# Patient Record
Sex: MALE | Race: WHITE | Employment: FULL TIME | URBAN - METROPOLITAN AREA
[De-identification: names, ages, dates, MRNs, and addresses within clinical notes are randomized per-mention and may not be internally consistent; named-entity substitution may affect disease eponyms.]

---

## 2018-09-02 ENCOUNTER — HOSPITAL ENCOUNTER (EMERGENCY)
Age: 28
Discharge: HOME | End: 2018-09-02
Attending: EMERGENCY MEDICINE

## 2018-09-02 VITALS
OXYGEN SATURATION: 99 % | RESPIRATION RATE: 18 BRPM | WEIGHT: 180 LBS | HEIGHT: 72 IN | SYSTOLIC BLOOD PRESSURE: 108 MMHG | HEART RATE: 74 BPM | TEMPERATURE: 98 F | DIASTOLIC BLOOD PRESSURE: 78 MMHG | BODY MASS INDEX: 24.38 KG/M2

## 2018-09-02 DIAGNOSIS — F14.10 COCAINE ABUSE (HCC): ICD-10-CM

## 2018-09-02 DIAGNOSIS — T40.1X1A ACCIDENTAL OVERDOSE OF HEROIN, INITIAL ENCOUNTER (HCC): Primary | ICD-10-CM

## 2018-09-02 LAB
ACETAMINOPHEN LEVEL: <5 MCG/ML (ref 10–30)
ALBUMIN SERPL-MCNC: 4.8 G/DL (ref 3.5–5.2)
ALP BLD-CCNC: 78 U/L (ref 40–129)
ALT SERPL-CCNC: 41 U/L (ref 0–40)
AMPHETAMINE SCREEN, URINE: NOT DETECTED
ANION GAP SERPL CALCULATED.3IONS-SCNC: 16 MMOL/L (ref 7–16)
AST SERPL-CCNC: 24 U/L (ref 0–39)
BACTERIA: ABNORMAL /HPF
BARBITURATE SCREEN URINE: NOT DETECTED
BENZODIAZEPINE SCREEN, URINE: NOT DETECTED
BILIRUB SERPL-MCNC: 0.6 MG/DL (ref 0–1.2)
BILIRUBIN URINE: NEGATIVE
BLOOD, URINE: NEGATIVE
BUN BLDV-MCNC: 14 MG/DL (ref 6–20)
CALCIUM SERPL-MCNC: 8.9 MG/DL (ref 8.6–10.2)
CANNABINOID SCREEN URINE: NOT DETECTED
CHLORIDE BLD-SCNC: 98 MMOL/L (ref 98–107)
CLARITY: CLEAR
CO2: 23 MMOL/L (ref 22–29)
COCAINE METABOLITE SCREEN URINE: POSITIVE
COLOR: YELLOW
CREAT SERPL-MCNC: 0.9 MG/DL (ref 0.7–1.2)
EKG ATRIAL RATE: 76 BPM
EKG P AXIS: 75 DEGREES
EKG P-R INTERVAL: 116 MS
EKG Q-T INTERVAL: 402 MS
EKG QRS DURATION: 98 MS
EKG QTC CALCULATION (BAZETT): 452 MS
EKG R AXIS: 84 DEGREES
EKG T AXIS: 63 DEGREES
EKG VENTRICULAR RATE: 76 BPM
ETHANOL: <10 MG/DL (ref 0–0.08)
GFR AFRICAN AMERICAN: >60
GFR NON-AFRICAN AMERICAN: >60 ML/MIN/1.73
GLUCOSE BLD-MCNC: 133 MG/DL (ref 74–109)
GLUCOSE URINE: NEGATIVE MG/DL
HCT VFR BLD CALC: 39.8 % (ref 37–54)
HEMOGLOBIN: 13.7 G/DL (ref 12.5–16.5)
KETONES, URINE: NEGATIVE MG/DL
LEUKOCYTE ESTERASE, URINE: NEGATIVE
MCH RBC QN AUTO: 31.4 PG (ref 26–35)
MCHC RBC AUTO-ENTMCNC: 34.4 % (ref 32–34.5)
MCV RBC AUTO: 91.1 FL (ref 80–99.9)
METHADONE SCREEN, URINE: NOT DETECTED
NITRITE, URINE: NEGATIVE
OPIATE SCREEN URINE: NOT DETECTED
PDW BLD-RTO: 12.8 FL (ref 11.5–15)
PH UA: 5.5 (ref 5–9)
PHENCYCLIDINE SCREEN URINE: NOT DETECTED
PLATELET # BLD: 236 E9/L (ref 130–450)
PMV BLD AUTO: 10.2 FL (ref 7–12)
POTASSIUM SERPL-SCNC: 4.5 MMOL/L (ref 3.5–5)
PROPOXYPHENE SCREEN: NOT DETECTED
PROTEIN UA: 100 MG/DL
RBC # BLD: 4.37 E12/L (ref 3.8–5.8)
RBC UA: ABNORMAL /HPF (ref 0–2)
SALICYLATE, SERUM: <0.3 MG/DL (ref 0–30)
SODIUM BLD-SCNC: 137 MMOL/L (ref 132–146)
SPECIFIC GRAVITY UA: >=1.03 (ref 1–1.03)
TOTAL PROTEIN: 8 G/DL (ref 6.4–8.3)
TRICYCLIC ANTIDEPRESSANTS SCREEN SERUM: NEGATIVE NG/ML
UROBILINOGEN, URINE: 0.2 E.U./DL
WBC # BLD: 13.1 E9/L (ref 4.5–11.5)
WBC UA: ABNORMAL /HPF (ref 0–5)

## 2018-09-02 PROCEDURE — 81001 URINALYSIS AUTO W/SCOPE: CPT

## 2018-09-02 PROCEDURE — 80053 COMPREHEN METABOLIC PANEL: CPT

## 2018-09-02 PROCEDURE — 80307 DRUG TEST PRSMV CHEM ANLYZR: CPT

## 2018-09-02 PROCEDURE — 36415 COLL VENOUS BLD VENIPUNCTURE: CPT

## 2018-09-02 PROCEDURE — 6370000000 HC RX 637 (ALT 250 FOR IP): Performed by: EMERGENCY MEDICINE

## 2018-09-02 PROCEDURE — 85027 COMPLETE CBC AUTOMATED: CPT

## 2018-09-02 PROCEDURE — 99284 EMERGENCY DEPT VISIT MOD MDM: CPT

## 2018-09-02 PROCEDURE — G0480 DRUG TEST DEF 1-7 CLASSES: HCPCS

## 2018-09-02 RX ORDER — ACETAMINOPHEN 325 MG/1
650 TABLET ORAL ONCE
Status: COMPLETED | OUTPATIENT
Start: 2018-09-02 | End: 2018-09-02

## 2018-09-02 RX ORDER — ACETAMINOPHEN 325 MG/1
TABLET ORAL
Status: DISCONTINUED
Start: 2018-09-02 | End: 2018-09-02 | Stop reason: HOSPADM

## 2018-09-02 RX ADMIN — ACETAMINOPHEN 650 MG: 325 TABLET ORAL at 03:15

## 2018-09-02 ASSESSMENT — PAIN DESCRIPTION - LOCATION: LOCATION: HEAD

## 2018-09-02 ASSESSMENT — PAIN DESCRIPTION - FREQUENCY: FREQUENCY: CONTINUOUS

## 2018-09-02 ASSESSMENT — PAIN SCALES - GENERAL
PAINLEVEL_OUTOF10: 0
PAINLEVEL_OUTOF10: 9
PAINLEVEL_OUTOF10: 10

## 2018-09-02 ASSESSMENT — PAIN DESCRIPTION - ORIENTATION: ORIENTATION: RIGHT;LEFT

## 2018-09-02 ASSESSMENT — PAIN DESCRIPTION - ONSET: ONSET: SUDDEN

## 2018-09-02 ASSESSMENT — PAIN DESCRIPTION - PROGRESSION: CLINICAL_PROGRESSION: GRADUALLY WORSENING

## 2018-09-02 ASSESSMENT — PAIN DESCRIPTION - PAIN TYPE: TYPE: ACUTE PAIN

## 2018-09-02 ASSESSMENT — PAIN DESCRIPTION - DESCRIPTORS: DESCRIPTORS: HEADACHE

## 2018-09-02 NOTE — ED PROVIDER NOTES
crepitus, no meningeal signs  Respiratory: Lungs clear to auscultation bilaterally, no wheezes, rales, or rhonchi. Not in respiratory distress  Cardiovascular:  Regular rate. Regular rhythm. No murmurs, gallops, or rubs. 2+ distal pulses  GI:  Abdomen Soft, Non tender, Non distended. +BS. No rebound, guarding, or rigidity. No pulsatile masses. Musculoskeletal: Moves all extremities x 4. Warm and well perfused, no clubbing, cyanosis, or edema. Capillary refill <3 seconds  Integument: skin warm and dry. No rashes. Neurologic: GCS 15, no focal deficits, symmetric strength in the upper and lower extremities bilaterally  Psychiatric: Normal Affect      -------------------------------------------------- RESULTS -------------------------------------------------  I have personally reviewed all laboratory and imaging results for this patient. Results are listed below.      LABS:  Results for orders placed or performed during the hospital encounter of 09/02/18   CBC   Result Value Ref Range    WBC 13.1 (H) 4.5 - 11.5 E9/L    RBC 4.37 3.80 - 5.80 E12/L    Hemoglobin 13.7 12.5 - 16.5 g/dL    Hematocrit 39.8 37.0 - 54.0 %    MCV 91.1 80.0 - 99.9 fL    MCH 31.4 26.0 - 35.0 pg    MCHC 34.4 32.0 - 34.5 %    RDW 12.8 11.5 - 15.0 fL    Platelets 032 644 - 591 E9/L    MPV 10.2 7.0 - 12.0 fL   Comprehensive Metabolic Panel   Result Value Ref Range    Sodium 137 132 - 146 mmol/L    Potassium 4.5 3.5 - 5.0 mmol/L    Chloride 98 98 - 107 mmol/L    CO2 23 22 - 29 mmol/L    Anion Gap 16 7 - 16 mmol/L    Glucose 133 (H) 74 - 109 mg/dL    BUN 14 6 - 20 mg/dL    CREATININE 0.9 0.7 - 1.2 mg/dL    GFR Non-African American >60 >=60 mL/min/1.73    GFR African American >60     Calcium 8.9 8.6 - 10.2 mg/dL    Total Protein 8.0 6.4 - 8.3 g/dL    Alb 4.8 3.5 - 5.2 g/dL    Total Bilirubin 0.6 0.0 - 1.2 mg/dL    Alkaline Phosphatase 78 40 - 129 U/L    ALT 41 (H) 0 - 40 U/L    AST 24 0 - 39 U/L   Urinalysis   Result Value Ref Range    Color, UA

## 2018-09-06 LAB — COCAINE, CONFIRM, URINE: >1000 NG/ML

## 2019-02-20 ENCOUNTER — HOSPITAL ENCOUNTER (OUTPATIENT)
Age: 29
Discharge: HOME | End: 2019-02-20
Payer: MEDICAID

## 2019-02-20 PROCEDURE — 87340 HEPATITIS B SURFACE AG IA: CPT

## 2019-02-20 PROCEDURE — 86803 HEPATITIS C AB TEST: CPT

## 2019-02-20 PROCEDURE — 36415 COLL VENOUS BLD VENIPUNCTURE: CPT

## 2019-02-20 PROCEDURE — 86703 HIV-1/HIV-2 1 RESULT ANTBDY: CPT

## 2019-02-20 PROCEDURE — 87350 HEPATITIS BE AG IA: CPT

## 2019-02-20 PROCEDURE — 86706 HEP B SURFACE ANTIBODY: CPT

## 2019-02-21 LAB
HBV SURFACE AB TITR SER: REACTIVE {TITER}
HEPATITIS B SURFACE ANTIGEN INTERPRETATION: NORMAL
HEPATITIS C ANTIBODY INTERPRETATION: NORMAL
HIV-1 AND HIV-2 ANTIBODIES: NORMAL

## 2019-02-22 LAB — HEPATITIS BE ANTIGEN: NEGATIVE

## 2019-04-05 ENCOUNTER — OFFICE VISIT (OUTPATIENT)
Dept: FAMILY MEDICINE CLINIC | Age: 29
End: 2019-04-05
Payer: MEDICAID

## 2019-04-05 VITALS
SYSTOLIC BLOOD PRESSURE: 110 MMHG | HEIGHT: 72 IN | HEART RATE: 111 BPM | DIASTOLIC BLOOD PRESSURE: 78 MMHG | TEMPERATURE: 99.5 F | WEIGHT: 204.5 LBS | OXYGEN SATURATION: 96 % | BODY MASS INDEX: 27.7 KG/M2 | RESPIRATION RATE: 16 BRPM

## 2019-04-05 DIAGNOSIS — J11.1 INFLUENZA-LIKE ILLNESS: Primary | ICD-10-CM

## 2019-04-05 LAB
INFLUENZA A ANTIGEN, POC: NORMAL
INFLUENZA B ANTIGEN, POC: NORMAL

## 2019-04-05 PROCEDURE — G8419 CALC BMI OUT NRM PARAM NOF/U: HCPCS | Performed by: PHYSICIAN ASSISTANT

## 2019-04-05 PROCEDURE — 99213 OFFICE O/P EST LOW 20 MIN: CPT | Performed by: PHYSICIAN ASSISTANT

## 2019-04-05 PROCEDURE — 87804 INFLUENZA ASSAY W/OPTIC: CPT | Performed by: PHYSICIAN ASSISTANT

## 2019-04-05 PROCEDURE — G8427 DOCREV CUR MEDS BY ELIG CLIN: HCPCS | Performed by: PHYSICIAN ASSISTANT

## 2019-04-05 PROCEDURE — 4004F PT TOBACCO SCREEN RCVD TLK: CPT | Performed by: PHYSICIAN ASSISTANT

## 2019-04-05 RX ORDER — OSELTAMIVIR PHOSPHATE 75 MG/1
75 CAPSULE ORAL 2 TIMES DAILY
Qty: 10 CAPSULE | Refills: 0 | Status: SHIPPED | OUTPATIENT
Start: 2019-04-05 | End: 2019-04-10

## 2019-04-05 RX ORDER — BROMPHENIRAMINE MALEATE, PSEUDOEPHEDRINE HYDROCHLORIDE, AND DEXTROMETHORPHAN HYDROBROMIDE 2; 30; 10 MG/5ML; MG/5ML; MG/5ML
10 SYRUP ORAL 4 TIMES DAILY PRN
Qty: 120 ML | Refills: 0 | Status: SHIPPED
Start: 2019-04-05 | End: 2021-04-06 | Stop reason: ALTCHOICE

## 2019-04-05 RX ORDER — IBUPROFEN 800 MG/1
800 TABLET ORAL EVERY 8 HOURS PRN
Qty: 20 TABLET | Refills: 0 | Status: SHIPPED
Start: 2019-04-05 | End: 2021-04-06 | Stop reason: ALTCHOICE

## 2019-04-05 NOTE — PROGRESS NOTES
Chief Complaint   Generalized Body Aches (x 48 hours); Congestion (chills, subj fever); Cough (productive); and Nasal Congestion    History of Present Illness   Source of history provided by: patient. Michael Mims is a 33 y.o. old male who has a past medical history of: History reviewed. No pertinent past medical history. Presents to the [facility] with complaints of a nonproductive cough, subjective fever, body aches, chills, mild nausea, sore throat, and nasal congestion/drainage. States symptoms have been present x 48 hours. Denies any CP, SOB, abdominal pain, vomiting, diarrhea, rash, or lethargy. Has been taking Dayquil without symptomatic relief. Denies any hx of asthma. Of note, pt came with a friend today who tested positive for flu in our office. ROS   Pertinent positives and negatives are stated within HPI, all other systems reviewed and are negative. History reviewed. No pertinent surgical history. Social History:  reports that he has never smoked. His smokeless tobacco use includes chew. He reports that he does not drink alcohol or use drugs. Family History: family history is not on file. Allergies: Patient has no known allergies. Physical Exam      VS:  /78 (Site: Left Upper Arm, Position: Sitting, Cuff Size: Medium Adult)   Pulse 111   Temp 99.5 °F (37.5 °C) (Oral)   Resp 16   Ht 6' (1.829 m)   Wt 204 lb 8 oz (92.8 kg)   SpO2 96%   BMI 27.74 kg/m²    Oxygen Saturation Interpretation: Normal.    Constitutional:  Alert, development consistent with age. NAD. Head:  NC/NT. Airway patent. Ears: TMs dull bilaterally. Canals without exudate or swelling bilaterally. Nose: Mild congestion of the nasal mucosa with clear drainage. Mouth: Posterior pharynx with mild erythema and clear postnasal drip. No tonsillar hypertrophy or exudate. Neck:  Normal ROM. Supple. No anterior cervical adenopathy noted. Lungs: CTAB without wheezes, rales, or rhonchi.   CV:  Regular rate

## 2021-04-06 ENCOUNTER — HOSPITAL ENCOUNTER (EMERGENCY)
Age: 31
Discharge: HOME | End: 2021-04-06
Payer: COMMERCIAL

## 2021-04-06 ENCOUNTER — APPOINTMENT (OUTPATIENT)
Dept: GENERAL RADIOLOGY | Age: 31
End: 2021-04-06
Payer: COMMERCIAL

## 2021-04-06 VITALS
SYSTOLIC BLOOD PRESSURE: 120 MMHG | WEIGHT: 198 LBS | BODY MASS INDEX: 26.82 KG/M2 | TEMPERATURE: 99.3 F | DIASTOLIC BLOOD PRESSURE: 80 MMHG | RESPIRATION RATE: 16 BRPM | OXYGEN SATURATION: 98 % | HEART RATE: 94 BPM | HEIGHT: 72 IN

## 2021-04-06 DIAGNOSIS — Z20.2 POSSIBLE EXPOSURE TO STD: ICD-10-CM

## 2021-04-06 DIAGNOSIS — J02.9 ACUTE PHARYNGITIS, UNSPECIFIED ETIOLOGY: ICD-10-CM

## 2021-04-06 DIAGNOSIS — R11.2 NON-INTRACTABLE VOMITING WITH NAUSEA, UNSPECIFIED VOMITING TYPE: ICD-10-CM

## 2021-04-06 DIAGNOSIS — Z20.822 SUSPECTED COVID-19 VIRUS INFECTION: Primary | ICD-10-CM

## 2021-04-06 LAB
ALBUMIN SERPL-MCNC: 4.5 G/DL (ref 3.5–5.2)
ALP BLD-CCNC: 55 U/L (ref 40–129)
ALT SERPL-CCNC: 31 U/L (ref 0–40)
ANION GAP SERPL CALCULATED.3IONS-SCNC: 11 MMOL/L (ref 7–16)
AST SERPL-CCNC: 15 U/L (ref 0–39)
BASOPHILS ABSOLUTE: 0.02 E9/L (ref 0–0.2)
BASOPHILS RELATIVE PERCENT: 0.2 % (ref 0–2)
BILIRUB SERPL-MCNC: 0.8 MG/DL (ref 0–1.2)
BILIRUBIN URINE: NEGATIVE
BLOOD, URINE: NEGATIVE
BUN BLDV-MCNC: 10 MG/DL (ref 6–20)
CALCIUM SERPL-MCNC: 9.3 MG/DL (ref 8.6–10.2)
CHLORIDE BLD-SCNC: 101 MMOL/L (ref 98–107)
CLARITY: CLEAR
CO2: 26 MMOL/L (ref 22–29)
COLOR: YELLOW
CREAT SERPL-MCNC: 0.9 MG/DL (ref 0.7–1.2)
EOSINOPHILS ABSOLUTE: 0.03 E9/L (ref 0.05–0.5)
EOSINOPHILS RELATIVE PERCENT: 0.2 % (ref 0–6)
GFR AFRICAN AMERICAN: >60
GFR NON-AFRICAN AMERICAN: >60 ML/MIN/1.73
GLUCOSE BLD-MCNC: 105 MG/DL (ref 74–99)
GLUCOSE URINE: NEGATIVE MG/DL
HCT VFR BLD CALC: 44.3 % (ref 37–54)
HEMOGLOBIN: 15.5 G/DL (ref 12.5–16.5)
IMMATURE GRANULOCYTES #: 0.04 E9/L
IMMATURE GRANULOCYTES %: 0.3 % (ref 0–5)
INFLUENZA A BY PCR: NOT DETECTED
INFLUENZA B BY PCR: NOT DETECTED
KETONES, URINE: NEGATIVE MG/DL
LACTIC ACID, SEPSIS: 1.3 MMOL/L (ref 0.5–1.9)
LEUKOCYTE ESTERASE, URINE: NEGATIVE
LYMPHOCYTES ABSOLUTE: 1.01 E9/L (ref 1.5–4)
LYMPHOCYTES RELATIVE PERCENT: 8 % (ref 20–42)
MCH RBC QN AUTO: 31.7 PG (ref 26–35)
MCHC RBC AUTO-ENTMCNC: 35 % (ref 32–34.5)
MCV RBC AUTO: 90.6 FL (ref 80–99.9)
MONO TEST: NEGATIVE
MONOCYTES ABSOLUTE: 1.32 E9/L (ref 0.1–0.95)
MONOCYTES RELATIVE PERCENT: 10.4 % (ref 2–12)
NEUTROPHILS ABSOLUTE: 10.26 E9/L (ref 1.8–7.3)
NEUTROPHILS RELATIVE PERCENT: 80.9 % (ref 43–80)
NITRITE, URINE: NEGATIVE
PDW BLD-RTO: 12.3 FL (ref 11.5–15)
PH UA: 7.5 (ref 5–9)
PLATELET # BLD: 177 E9/L (ref 130–450)
PMV BLD AUTO: 10.6 FL (ref 7–12)
POTASSIUM SERPL-SCNC: 4.2 MMOL/L (ref 3.5–5)
PROTEIN UA: NEGATIVE MG/DL
RBC # BLD: 4.89 E12/L (ref 3.8–5.8)
SODIUM BLD-SCNC: 138 MMOL/L (ref 132–146)
SPECIFIC GRAVITY UA: 1.02 (ref 1–1.03)
STREP GRP A PCR: NEGATIVE
TOTAL PROTEIN: 7.6 G/DL (ref 6.4–8.3)
UROBILINOGEN, URINE: 0.2 E.U./DL
WBC # BLD: 12.7 E9/L (ref 4.5–11.5)

## 2021-04-06 PROCEDURE — 99284 EMERGENCY DEPT VISIT MOD MDM: CPT

## 2021-04-06 PROCEDURE — 96374 THER/PROPH/DIAG INJ IV PUSH: CPT

## 2021-04-06 PROCEDURE — U0005 INFEC AGEN DETEC AMPLI PROBE: HCPCS

## 2021-04-06 PROCEDURE — 96375 TX/PRO/DX INJ NEW DRUG ADDON: CPT

## 2021-04-06 PROCEDURE — U0003 INFECTIOUS AGENT DETECTION BY NUCLEIC ACID (DNA OR RNA); SEVERE ACUTE RESPIRATORY SYNDROME CORONAVIRUS 2 (SARS-COV-2) (CORONAVIRUS DISEASE [COVID-19]), AMPLIFIED PROBE TECHNIQUE, MAKING USE OF HIGH THROUGHPUT TECHNOLOGIES AS DESCRIBED BY CMS-2020-01-R: HCPCS

## 2021-04-06 PROCEDURE — 36415 COLL VENOUS BLD VENIPUNCTURE: CPT

## 2021-04-06 PROCEDURE — 87070 CULTURE OTHR SPECIMN AEROBIC: CPT

## 2021-04-06 PROCEDURE — 87880 STREP A ASSAY W/OPTIC: CPT

## 2021-04-06 PROCEDURE — 83605 ASSAY OF LACTIC ACID: CPT

## 2021-04-06 PROCEDURE — 2580000003 HC RX 258: Performed by: NURSE PRACTITIONER

## 2021-04-06 PROCEDURE — 6370000000 HC RX 637 (ALT 250 FOR IP): Performed by: NURSE PRACTITIONER

## 2021-04-06 PROCEDURE — 87591 N.GONORRHOEAE DNA AMP PROB: CPT

## 2021-04-06 PROCEDURE — 81003 URINALYSIS AUTO W/O SCOPE: CPT

## 2021-04-06 PROCEDURE — 87491 CHLMYD TRACH DNA AMP PROBE: CPT

## 2021-04-06 PROCEDURE — 6360000002 HC RX W HCPCS: Performed by: NURSE PRACTITIONER

## 2021-04-06 PROCEDURE — 86308 HETEROPHILE ANTIBODY SCREEN: CPT

## 2021-04-06 PROCEDURE — 2500000003 HC RX 250 WO HCPCS: Performed by: NURSE PRACTITIONER

## 2021-04-06 PROCEDURE — 85025 COMPLETE CBC W/AUTO DIFF WBC: CPT

## 2021-04-06 PROCEDURE — 87502 INFLUENZA DNA AMP PROBE: CPT

## 2021-04-06 PROCEDURE — 80053 COMPREHEN METABOLIC PANEL: CPT

## 2021-04-06 PROCEDURE — 96372 THER/PROPH/DIAG INJ SC/IM: CPT

## 2021-04-06 PROCEDURE — 71045 X-RAY EXAM CHEST 1 VIEW: CPT

## 2021-04-06 RX ORDER — ONDANSETRON 2 MG/ML
4 INJECTION INTRAMUSCULAR; INTRAVENOUS ONCE
Status: COMPLETED | OUTPATIENT
Start: 2021-04-06 | End: 2021-04-06

## 2021-04-06 RX ORDER — 0.9 % SODIUM CHLORIDE 0.9 %
1000 INTRAVENOUS SOLUTION INTRAVENOUS ONCE
Status: COMPLETED | OUTPATIENT
Start: 2021-04-06 | End: 2021-04-06

## 2021-04-06 RX ORDER — KETOROLAC TROMETHAMINE 30 MG/ML
15 INJECTION, SOLUTION INTRAMUSCULAR; INTRAVENOUS ONCE
Status: COMPLETED | OUTPATIENT
Start: 2021-04-06 | End: 2021-04-06

## 2021-04-06 RX ORDER — AMOXICILLIN 500 MG/1
500 CAPSULE ORAL 2 TIMES DAILY
Qty: 20 CAPSULE | Refills: 0 | Status: SHIPPED | OUTPATIENT
Start: 2021-04-06 | End: 2021-04-16

## 2021-04-06 RX ORDER — AZITHROMYCIN 250 MG/1
1000 TABLET, FILM COATED ORAL ONCE
Status: COMPLETED | OUTPATIENT
Start: 2021-04-06 | End: 2021-04-06

## 2021-04-06 RX ORDER — ONDANSETRON 4 MG/1
4 TABLET, ORALLY DISINTEGRATING ORAL EVERY 8 HOURS PRN
Qty: 6 TABLET | Refills: 0 | Status: SHIPPED | OUTPATIENT
Start: 2021-04-06 | End: 2022-01-26

## 2021-04-06 RX ADMIN — KETOROLAC TROMETHAMINE 15 MG: 30 INJECTION, SOLUTION INTRAMUSCULAR; INTRAVENOUS at 17:52

## 2021-04-06 RX ADMIN — SODIUM CHLORIDE 1000 ML: 9 INJECTION, SOLUTION INTRAVENOUS at 17:52

## 2021-04-06 RX ADMIN — AZITHROMYCIN 1000 MG: 250 TABLET, FILM COATED ORAL at 19:06

## 2021-04-06 RX ADMIN — LIDOCAINE HYDROCHLORIDE 500 MG: 10 INJECTION, SOLUTION EPIDURAL; INFILTRATION; INTRACAUDAL; PERINEURAL at 19:05

## 2021-04-06 RX ADMIN — ONDANSETRON 4 MG: 2 INJECTION INTRAMUSCULAR; INTRAVENOUS at 17:52

## 2021-04-06 ASSESSMENT — PAIN SCALES - GENERAL
PAINLEVEL_OUTOF10: 5
PAINLEVEL_OUTOF10: 5

## 2021-04-06 ASSESSMENT — PAIN DESCRIPTION - FREQUENCY: FREQUENCY: CONTINUOUS

## 2021-04-06 ASSESSMENT — PAIN DESCRIPTION - LOCATION: LOCATION: OTHER (COMMENT)

## 2021-04-06 ASSESSMENT — PAIN DESCRIPTION - PAIN TYPE: TYPE: ACUTE PAIN

## 2021-04-06 ASSESSMENT — PAIN DESCRIPTION - ONSET: ONSET: GRADUAL

## 2021-04-06 NOTE — LETTER
[address] [facility] Emergency Department  [id number] [facility] [address]  Phone: [phone]             April 6, 2021    Patient: Breana Brar   YOB: 1990   Date of Visit: 4/6/2021       To Whom It May Concern:    Bryan Arias was seen and treated in our emergency department on 4/6/2021. Bryan Arias was tested for Covid 19. He is instructed to self isolate until test results.       Sincerely,             Signature:__________________________________

## 2021-04-06 NOTE — ED PROVIDER NOTES
[facility]  Department of Emergency Medicine   ED  Encounter Note  Admit Date/RoomTime: 2021  4:56 PM  ED Room: JUNIOR/JUNIOR      NAME: Claudia Douglas  : 1990  MRN: [id number]     Chief Complaint:  Nausea (started around 0100), Pharyngitis (noticed white spots in the back of his throat, was concerned for possible STI), Emesis (vomited liquid, no food.), Generalized Body Aches, and Fever    History of Present Illness      Claudia Douglas is a 31 y.o. old male who presents to the emergency department for complaint of sore throat, nausea, vomiting, body aches, and a fever starting around 1 AM this morning. He reported that he works midnights and was at work when he developed symptoms. He reports that he noticed white spots on his tonsils. Also reports that he has been involved with a female and is concerned for possible exposure to STD. Denies penile discharge or urinary complaints. Reports that he did have oral sex with this female. Reports 2 episode of emesis since 1 AM.  No hematemesis or coffee-ground emesis. States that he has had a low-grade temp of 100.4. Denies diarrhea, melena, hematochezia, constipation, abdominal pain, chest pain, shortness of breath, back pain, neck pain/stiffness, loss of smell or taste, cough, earache, runny nose, difficulty swallowing, lightheadedness, dizziness, syncope, or any other plaints at this time. Exposed To: Streptococcus: no.                              Infectious Mononucleosis:  unknown. Symptoms:  Pain:  Yes.                             Muffled Voice:  No.                            Hoarse:  No.                            Difficulty with Secretions:  No.    Centor Score (MODIFIED) For Strep Pharyngitis:    Age 3-14 Years   no (0)     Age >44 Years   NO     Exudate or Swelling on Tonsils   yes (1)     Tender/Swollen Anterior Cervical Nodes   no (0)     Fever? (T > 38°C, 100.4°F)   yes (1) Absence of Cough   yes (1)   TOTAL POINTS   3   Score of Zero = Probability of Strep Pharyngitis: 1% - 2.5%. ,   No further testing nor antibiotics. Score of 1 = Probability of Strep Pharyngitis: 5% - 10%. ,   No further testing nor antibiotics. Score of 2 = Probability of Strep Phar: 11% - 17%. Culture/test all, ATB's only for positive culture results. Score of 3 = Probability of Strep Phar: 28% - 35%. Culture/test all, ATB's only for positive culture results  Score of 4 or 5 = Probability of Strep Pharyngitis: 51% - 53%. Treat empirically with antibiotics. ROS   Pertinent positives and negatives are stated within HPI, all other systems reviewed and are negative. Past Medical History:  has no past medical history on file. Surgical History:  has no past surgical history on file. Social History:  reports that he has never smoked. His smokeless tobacco use includes chew. He reports that he does not drink alcohol or use drugs. Family History: family history is not on file. Allergies: Patient has no known allergies. Physical Exam   Oxygen Saturation Interpretation: Normal.        ED Triage Vitals   BP Temp Temp Source Pulse Resp SpO2 Height Weight   04/06/21 1654 04/06/21 1653 04/06/21 1653 04/06/21 1653 04/06/21 1653 04/06/21 1653 04/06/21 1711 04/06/21 1711   122/84 100.3 °F (37.9 °C) Temporal 118 18 98 % 6' (1.829 m) 198 lb (89.8 kg)         Constitutional:  Alert, development consistent with age. .  Ears:  TMs without perforation, injection, or bulging. External canals clear without exudate. Throat: Airway Patent. mild erythema, tonsillar hypertrophy, 1+ and exudates present. Neck/Lymphatic:  Neck supple. There is Bilateral  anterior cervical node tenderness. respiratory:  Clear to auscultation and breath sounds equal.    CV: Regular rate and rhythm, normal heart sounds, without pathological murmurs, ectopy, gallops, or rubs. Peripheral pulses 2 + palpable.    GI:  Abdomen Soft, nontender, good bowel sounds. No firm or pulsatile mass. Inegument:  No rashes, erythema present. Neurological:  Oriented. Motor functions intact.     Lab / Imaging Results   (All laboratory and radiology results have been personally reviewed by myself)  Labs:  Results for orders placed or performed during the hospital encounter of 04/06/21   C.trachomatis N.gonorrhoeae DNA, Urine    Specimen: Urine   Result Value Ref Range    Source Urine    Strep Screen Group A Throat    Specimen: Throat   Result Value Ref Range    Strep Grp A PCR Negative Negative   Rapid influenza A/B antigens    Specimen: Nasopharyngeal   Result Value Ref Range    Influenza A by PCR Not Detected Not Detected    Influenza B by PCR Not Detected Not Detected   CBC Auto Differential   Result Value Ref Range    WBC 12.7 (H) 4.5 - 11.5 E9/L    RBC 4.89 3.80 - 5.80 E12/L    Hemoglobin 15.5 12.5 - 16.5 g/dL    Hematocrit 44.3 37.0 - 54.0 %    MCV 90.6 80.0 - 99.9 fL    MCH 31.7 26.0 - 35.0 pg    MCHC 35.0 (H) 32.0 - 34.5 %    RDW 12.3 11.5 - 15.0 fL    Platelets 984 339 - 593 E9/L    MPV 10.6 7.0 - 12.0 fL    Neutrophils % 80.9 (H) 43.0 - 80.0 %    Immature Granulocytes % 0.3 0.0 - 5.0 %    Lymphocytes % 8.0 (L) 20.0 - 42.0 %    Monocytes % 10.4 2.0 - 12.0 %    Eosinophils % 0.2 0.0 - 6.0 %    Basophils % 0.2 0.0 - 2.0 %    Neutrophils Absolute 10.26 (H) 1.80 - 7.30 E9/L    Immature Granulocytes # 0.04 E9/L    Lymphocytes Absolute 1.01 (L) 1.50 - 4.00 E9/L    Monocytes Absolute 1.32 (H) 0.10 - 0.95 E9/L    Eosinophils Absolute 0.03 (L) 0.05 - 0.50 E9/L    Basophils Absolute 0.02 0.00 - 0.20 E9/L   Lactate, Sepsis   Result Value Ref Range    Lactic Acid, Sepsis 1.3 0.5 - 1.9 mmol/L   Urinalysis, reflex to microscopic   Result Value Ref Range    Color, UA Yellow Straw/Yellow    Clarity, UA Clear Clear    Glucose, Ur Negative Negative mg/dL    Bilirubin Urine Negative Negative    Ketones, Urine Negative Negative mg/dL    Specific Gravity, UA 1.020 1.005 - 1.030    Blood, Urine Negative Negative    pH, UA 7.5 5.0 - 9.0    Protein, UA Negative Negative mg/dL    Urobilinogen, Urine 0.2 <2.0 E.U./dL    Nitrite, Urine Negative Negative    Leukocyte Esterase, Urine Negative Negative   Mononucleosis screen   Result Value Ref Range    Mono Test Negative Negative   Comprehensive Metabolic Panel   Result Value Ref Range    Sodium 138 132 - 146 mmol/L    Potassium 4.2 3.5 - 5.0 mmol/L    Chloride 101 98 - 107 mmol/L    CO2 26 22 - 29 mmol/L    Anion Gap 11 7 - 16 mmol/L    Glucose 105 (H) 74 - 99 mg/dL    BUN 10 6 - 20 mg/dL    CREATININE 0.9 0.7 - 1.2 mg/dL    GFR Non-African American >60 >=60 mL/min/1.73    GFR African American >60     Calcium 9.3 8.6 - 10.2 mg/dL    Total Protein 7.6 6.4 - 8.3 g/dL    Albumin 4.5 3.5 - 5.2 g/dL    Total Bilirubin 0.8 0.0 - 1.2 mg/dL    Alkaline Phosphatase 55 40 - 129 U/L    ALT 31 0 - 40 U/L    AST 15 0 - 39 U/L     Imaging: All Radiology results interpreted by Radiologist unless otherwise noted. XR CHEST PORTABLE   Final Result   No acute process. ED Course / Medical Decision Making     Medications   0.9 % sodium chloride bolus (0 mLs Intravenous Stopped 4/6/21 2055)   ketorolac (TORADOL) injection 15 mg (15 mg Intravenous Given 4/6/21 1752)   ondansetron (ZOFRAN) injection 4 mg (4 mg Intravenous Given 4/6/21 1752)   cefTRIAXone (ROCEPHIN) 500 mg in lidocaine 1 % 1.43 mL IM Injection (500 mg Intramuscular Given 4/6/21 1905)   azithromycin (ZITHROMAX) tablet 1,000 mg (1,000 mg Oral Given 4/6/21 1906)        Consult(s):   None    Procedure(s):   none    MDM:   Mallorie Vallejo is a 70-year-old male who presented to the emergency department complaint of sore throat, nausea, vomiting, and body aches. He also reported concern for possible STI exposure. No hematic emesis or coffee-ground emesis. No urinary complaints. CBC shows mild leukocytosis of 12.7, H&H 15.5/44. 3. Rapid strep negative. Influenza AMB negative. Mononucleosis negative. Lactic acid 1.3. CMP unremarkable. Chest x-ray negative. Urine negative for any signs or concerns for UTI. Urine GC chlamydia pending. Patient was empirically treated with Rocephin and Zithromax. On physical exam he was noted to have oropharynx erythema with exudate noted. There was mild anterior cervical tenderness. Uvula was midline. No signs or concerns for abscess formation. prescribed amoxicillin. Covid test completed and pending results. He was instructed to self isolate until test results. He verbalized understanding agrees with plan. Tolerated p.o. while in the ED. He remained hemodynamically stable, nontoxic, and appropriate for outpatient management. Instructed to have close follow-up with his PCP and advised to return for any new, change, worsening symptoms or concerns. At this time the patient is without objective evidence of an acute process requiring hospitalization or inpatient management. They have remained hemodynamically stable throughout their entire ED visit and are stable for discharge with outpatient follow-up. The plan has been discussed in detail and they are aware of the specific conditions for emergent return, as well as the importance of follow-up. Counseling:  I reviewed today's visit with the patient in addition to providing specific details for the plan of care and counseling regarding the diagnosis and prognosis. Questions are answered at this time and are agreeable with the plan. Assessment     1. Suspected COVID-19 virus infection    2. Acute pharyngitis, unspecified etiology    3. Possible exposure to STD    4. Non-intractable vomiting with nausea, unspecified vomiting type      Plan   Discharge home.   Patient condition is good    New Medications     Discharge Medication List as of 4/6/2021  7:43 PM      START taking these medications    Details   amoxicillin (AMOXIL) 500 MG capsule Take 1 capsule by mouth 2 times daily for 10 days, Disp-20 capsule, R-0Print      ondansetron (ZOFRAN ODT) 4 MG disintegrating tablet Take 1 tablet by mouth every 8 hours as needed for Nausea or Vomiting, Disp-6 tablet, R-0Print           Electronically signed by NILAM Ribera CNP   DD: 4/6/21  **This report was transcribed using voice recognition software. Every effort was made to ensure accuracy; however, inadvertent computerized transcription errors may be present.   END OF ED PROVIDER NOTE      NILAM Ribera CNP  04/06/21 [id number]

## 2021-04-07 ENCOUNTER — CARE COORDINATION (OUTPATIENT)
Dept: CARE COORDINATION | Age: 31
End: 2021-04-07

## 2021-04-07 NOTE — CARE COORDINATION
Patient returned ACM's call. Patient contacted regarding [facility]. Discussed COVID-19 related testing which was pending at this time. Test results were pending. Patient informed of results, if available? N/A    Ambulatory Care Manager contacted the patient by telephone to perform post discharge assessment. Call within 2 business days of discharge: Yes. Verified name and  with patient as identifiers. Provided introduction to self, and explanation of the CTN/ACM role, and reason for call due to risk factors for infection and/or exposure to COVID-19. Symptoms reviewed with patient who verbalized the following symptoms: fever, pain or aching joints, nausea, vomiting and sore throat. Patient states his fever has gone down and he is feeling better. Due to no new or worsening symptoms encounter was not routed to provider for escalation. Discussed follow-up appointments. If no appointment was previously scheduled, appointment scheduling offered: Yes  [facility] follow up appointment(s): No future appointments. Non-[facility] follow up appointment(s):   Patient states he will call PCP to schedule an ED appointment after receiving his COVID test results. Patient states he does not need this ACM's assistance in making this call. Non-face-to-face services provided:  Reviewed AVS, provided information on [facility] in  [facility]:   Does patient have an Advance Directive:  Healthcare decision makers were reviewed, and information is current. Patient has following risk factors of: no known risk factors. ACM reviewed discharge instructions, medical action plan and red flags such as increased shortness of breath, increasing fever and signs of decompensation with patient who verbalized understanding. Discussed exposure protocols and quarantine with CDC Guidelines What to do if you are sick with coronavirus disease .  Patient was given an opportunity for questions and concerns. The patient agrees to contact the [facility] exposure line [phone], local health department [place] Department of Health: ([phone]) and PCP office for questions related to their healthcare. AC provided contact information for future needs. Reviewed and educated patient on any new and changed medications related to discharge diagnosis     Patient has started taking the medications ordered by the ED provider. Per patient request, [facility] emailed patient the link to activate his My Chart account and provided him with the contact information for My Chart staff should he need assistance in activating this account. Was patient discharged with a pulse oximeter? No    Patient/family/caregiver given information for Get[employer] Loop and agrees to enroll yes  Patient's preferred e-mail: [email or web address]   Patient's preferred phone number: [phone]  Based on Loop alert triggers, patient will be contacted by nurse care manager for worsening symptoms. Pt will be further monitored by COVID Loop Team based on severity of symptoms and risk factors. Steps to help prevent the spread of COVID-19 if you are sick  SOURCE - https://dennis-chu.info/. html     Stay home except to get medical care   ; Stay home: People who are mildly ill with COVID-19 are able to isolate at home during their illness. You should restrict activities outside your home, except for getting medical care.   ; Avoid public areas: Do not go to work, school, or public areas.   ; Avoid public transportation: Avoid using public transportation, ride-sharing, or taxis.  ; Separate yourself from other people and animals in your home   ; Stay away from others: As much as possible, you should stay in a specific room and away from other people in your home. Also, you should use a separate bathroom, if available.   ; Limit contact with pets & animals:  You should restrict contact with pets and other animals while you are sick with COVID-19, just like you would around other people. Although there have not been reports of pets or other animals becoming sick with COVID-19, it is still recommended that people sick with COVID-19 limit contact with animals until more information is known about the virus. ; When possible, have another member of your household care for your animals while you are sick. If you are sick with COVID-19, avoid contact with your pet, including petting, snuggling, being kissed or licked, and sharing food. If you must care for your pet or be around animals while you are sick, wash your hands before and after you interact with pets and wear a facemask. See COVID-19 and Animals for more information. Other considerations   The ill person should eat/be fed in their room if possible. Non-disposable  items used should be handled with gloves and washed with hot water or in a . Clean hands after handling used  items.  If possible, dedicate a lined trash can for the ill person. Use gloves when removing garbage bags, handling, and disposing of trash. Wash hands after handling or disposing of trash.  Consider consulting with your local health department about trash disposal guidance if available. Information for Household Members and Caregivers of Someone who is Sick   Call ahead before visiting your doctor   Call ahead: If you have a medical appointment, call the healthcare provider and tell them that you have or may have COVID-19. This will help the healthcare provider's office take steps to keep other people from getting infected or exposed. Wear a facemask if you are sick   ; If you are sick: You should wear a facemask when you are around other people (e.g., sharing a room or vehicle) or pets and before you enter a healthcare provider's office.    ; If you are caring for others: If the person who is sick is not able to wear a facemask (for example, because it causes trouble breathing), then people who live with the person who is sick should not stay in the same room with them, or they should wear a facemask if they enter a room with the person who is sick. Cover your coughs and sneezes   ; Cover: Cover your mouth and nose with a tissue when you cough or sneeze.   ; Dispose: Throw used tissues in a lined trash can.   ; Wash hands: Immediately wash your hands with soap and water for at least 20 seconds or, if soap and water are not available, clean your hands with an alcohol-based hand  that contains at least 60% alcohol. Clean your hands often   ; Wash hands: Wash your hands often with soap and water for at least 20 seconds, especially after blowing your nose, coughing, or sneezing; going to the bathroom; and before eating or preparing food.   ; Hand : If soap and water are not readily available, use an alcohol-based hand  with at least 60% alcohol, covering all surfaces of your hands and rubbing them together until they feel dry.   ; Soap and water: Soap and water are the best option if hands are visibly dirty.   ; Avoid touching: Avoid touching your eyes, nose, and mouth with unwashed hands. Handwashing Tips   ; Wet your hands with clean, running water (warm or cold), turn off the tap, and apply soap.  ; Lather your hands by rubbing them together with the soap. Lather the backs of your hands, between your fingers, and under your nails. ; Scrub your hands for at least 20 seconds. Need a timer? Hum the [place] from beginning to end twice.  ; Rinse your hands well under clean, running water.  ; Dry your hands using a clean towel or air dry them. Avoid sharing personal household items   ; Do not share: You should not share dishes, drinking glasses, cups, eating utensils, towels, or bedding with other people or pets in your home.   ; Wash thoroughly after use:  After using these items, they should be washed thoroughly with soap and water. Clean all high-touch surfaces everyday   ; Clean and disinfect: Practice routine cleaning of high touch surfaces.  ; High touch surfaces include counters, tabletops, doorknobs, bathroom fixtures, toilets, phones, keyboards, tablets, and bedside tables.  ; Disinfect areas with bodily fluids: Also, clean any surfaces that may have blood, stool, or body fluids on them.   ; Household : Use a household cleaning spray or wipe, according to the label instructions. Labels contain instructions for safe and effective use of the cleaning product including precautions you should take when applying the product, such as wearing gloves and making sure you have good ventilation during use of the product. Monitor your symptoms   Seek medical attention: Seek prompt medical attention if your illness is worsening     (e.g., difficulty breathing).   ; Call your doctor: Before seeking care, call your healthcare provider and tell them that you have, or are being evaluated for, COVID-19.   ; Wear a facemask when sick: Put on a facemask before you enter the facility. These steps will help the healthcare provider's office to keep other people in the office or waiting room from getting infected or exposed. ; Alert health department: Ask your healthcare provider to call the local or state health department. Persons who are placed under active monitoring or facilitated self-monitoring should follow instructions provided by their local health department or occupational health professionals, as appropriate.  ; Call 911 if you have a medical emergency: If you have a medical emergency and need to call 911, notify the dispatch personnel that you have, or are being evaluated for COVID-19. If possible, put on a facemask before emergency medical services arrive.

## 2021-04-07 NOTE — CARE COORDINATION
ACM attempted to contact patient as a follow up to his ER visit on 04/06/21. ACM left a HIPAA compliant voice message with contact information asking patient to return the call.      PLAN  Continue to attempt to contact patient

## 2021-04-08 LAB — SARS-COV-2, PCR: NOT DETECTED

## 2021-04-09 LAB
C. TRACHOMATIS DNA ,URINE: NEGATIVE
N. GONORRHOEAE DNA, URINE: NEGATIVE
SOURCE: NORMAL
THROAT CULTURE: NORMAL

## 2022-01-20 ENCOUNTER — APPOINTMENT (OUTPATIENT)
Dept: GENERAL RADIOLOGY | Age: 32
End: 2022-01-20
Payer: COMMERCIAL

## 2022-01-20 ENCOUNTER — HOSPITAL ENCOUNTER (EMERGENCY)
Age: 32
Discharge: HOME | End: 2022-01-20
Attending: EMERGENCY MEDICINE
Payer: COMMERCIAL

## 2022-01-20 VITALS
OXYGEN SATURATION: 100 % | RESPIRATION RATE: 11 BRPM | BODY MASS INDEX: 25.77 KG/M2 | WEIGHT: 190 LBS | SYSTOLIC BLOOD PRESSURE: 141 MMHG | DIASTOLIC BLOOD PRESSURE: 86 MMHG | HEART RATE: 79 BPM

## 2022-01-20 DIAGNOSIS — S82.392A CLOSED FRACTURE OF POSTERIOR MALLEOLUS OF LEFT TIBIA, INITIAL ENCOUNTER: ICD-10-CM

## 2022-01-20 DIAGNOSIS — S82.832A OTHER CLOSED FRACTURE OF DISTAL END OF LEFT FIBULA, INITIAL ENCOUNTER: Primary | ICD-10-CM

## 2022-01-20 PROCEDURE — 6370000000 HC RX 637 (ALT 250 FOR IP): Performed by: NURSE PRACTITIONER

## 2022-01-20 PROCEDURE — 29515 APPLICATION SHORT LEG SPLINT: CPT

## 2022-01-20 PROCEDURE — 96372 THER/PROPH/DIAG INJ SC/IM: CPT

## 2022-01-20 PROCEDURE — 73610 X-RAY EXAM OF ANKLE: CPT

## 2022-01-20 PROCEDURE — 6360000002 HC RX W HCPCS: Performed by: PHYSICIAN ASSISTANT

## 2022-01-20 PROCEDURE — 99284 EMERGENCY DEPT VISIT MOD MDM: CPT

## 2022-01-20 PROCEDURE — 2500000003 HC RX 250 WO HCPCS: Performed by: EMERGENCY MEDICINE

## 2022-01-20 PROCEDURE — 27768 CLTX POST ANKLE FX W/MNPJ: CPT

## 2022-01-20 PROCEDURE — 73600 X-RAY EXAM OF ANKLE: CPT

## 2022-01-20 PROCEDURE — 73590 X-RAY EXAM OF LOWER LEG: CPT

## 2022-01-20 RX ORDER — FENTANYL CITRATE 50 UG/ML
25 INJECTION, SOLUTION INTRAMUSCULAR; INTRAVENOUS ONCE
Status: COMPLETED | OUTPATIENT
Start: 2022-01-20 | End: 2022-01-20

## 2022-01-20 RX ORDER — KETAMINE HYDROCHLORIDE 10 MG/ML
1.5 INJECTION, SOLUTION INTRAMUSCULAR; INTRAVENOUS ONCE
Status: COMPLETED | OUTPATIENT
Start: 2022-01-20 | End: 2022-01-20

## 2022-01-20 RX ORDER — OXYCODONE HYDROCHLORIDE AND ACETAMINOPHEN 5; 325 MG/1; MG/1
2 TABLET ORAL ONCE
Status: COMPLETED | OUTPATIENT
Start: 2022-01-20 | End: 2022-01-20

## 2022-01-20 RX ORDER — OXYCODONE HYDROCHLORIDE AND ACETAMINOPHEN 5; 325 MG/1; MG/1
1 TABLET ORAL EVERY 6 HOURS PRN
Qty: 12 TABLET | Refills: 0 | Status: SHIPPED | OUTPATIENT
Start: 2022-01-20 | End: 2022-01-23

## 2022-01-20 RX ADMIN — OXYCODONE AND ACETAMINOPHEN 2 TABLET: 5; 325 TABLET ORAL at 11:59

## 2022-01-20 RX ADMIN — KETAMINE HYDROCHLORIDE 129.3 MG: 10 INJECTION, SOLUTION INTRAMUSCULAR; INTRAVENOUS at 15:13

## 2022-01-20 RX ADMIN — FENTANYL CITRATE 25 MCG: 0.05 INJECTION, SOLUTION INTRAMUSCULAR; INTRAVENOUS at 13:27

## 2022-01-20 ASSESSMENT — PAIN SCALES - GENERAL: PAINLEVEL_OUTOF10: 8

## 2022-01-20 NOTE — CONSULTS
Department of Orthopedic Surgery  Resident Consult Note          Reason for Consult: Fall, left ankle pain    HISTORY OF PRESENT ILLNESS:       Patient is a 27 y.o. male who presents with left ankle pain after a fall at work. Patient states that he is a  for [employer] and he was walking along his route today when he slipped on ice and twisted his left ankle, he felt immediate pain and had inability to ambulate afterwards. Denies any other injuries, did not hit his head. Denies numbness/tingling/paresthesias. Denies any other orthopedic complaints at this time. Patient does not have a significant medical history. This was a work injury. Past Medical History:    No past medical history on file. Past Surgical History:    No past surgical history on file. Current Medications:   No current facility-administered medications for this encounter. Allergies:  Patient has no known allergies. Social History:   TOBACCO:   reports that he has never smoked. His smokeless tobacco use includes chew. ETOH:   reports no history of alcohol use. DRUGS:   reports no history of drug use. ACTIVITIES OF DAILY LIVING:    OCCUPATION:    Family History:   No family history on file.     REVIEW OF SYSTEMS:  CONSTITUTIONAL:  negative for  fevers, chills  EYES:  negative for blurred vision, visual disturbance  HEENT:  negative for  hearing loss, voice change  RESPIRATORY:  negative for  dyspnea, wheezing  CARDIOVASCULAR:  negative for  chest pain, palpitations  GASTROINTESTINAL:  negative for nausea, vomiting  GENITOURINARY:  negative for frequency, urinary incontinence  HEMATOLOGIC/LYMPHATIC:  negative for bleeding and petechiae  MUSCULOSKELETAL:  positive for left ankle pain and deformity  NEUROLOGICAL:  negative for headaches, dizziness  BEHAVIOR/PSYCH:  negative for increased agitation and anxiety    PHYSICAL EXAM:    VITALS:  BP (!) 141/87   Pulse 82   Resp 16   Wt 190 lb (86.2 kg)   SpO2 98%   BMI 25.77 kg/m² CONSTITUTIONAL:  awake, alert, cooperative, no apparent distress, and appears stated age  MUSCULOSKELETAL:  Left lower Extremity:  · Skin intact circumferentially  · Compartments are soft and compressible  · There is moderate edema both over the medial and lateral malleoli  · Mild ecchymosis  · Tenderness palpation over the lateral malleolus  · Toes are warm perfused, patient able to wiggle toes, sensation intact to light touch in the saphenous, sural, peroneal's, tibial distributions  · 2+ distal pulses        DATA:    CBC:   Lab Results   Component Value Date    WBC 12.7 04/06/2021    RBC 4.89 04/06/2021    HGB 15.5 04/06/2021    HCT 44.3 04/06/2021    MCV 90.6 04/06/2021    MCH 31.7 04/06/2021    MCHC 35.0 04/06/2021    RDW 12.3 04/06/2021     04/06/2021    MPV 10.6 04/06/2021     PT/INR:  No results found for: PROTIME, INR    Radiology Review:  3 views of the left ankle reviewed. There is a spiral fracture, Tafoya C type, of the lateral malleolus with some shortening. Medial clear space appears widened. On the lateral view he can appreciate a posterior malleolus fracture, it is nondisplaced and involves approximately 10% of the articular surface. No other fractures or dislocations noted    2 views of the full-length tibia and fibula. Trimalleolar equivalent as previously noted. No fractures about the proximal tibia or fibula. Unremarkable knee    IMPRESSION:  · Left displaced trimalleolar equivalent, possible syndesmotic injury    PLAN:  · In the ED today the patient was closed reduced and splinted under conscious sedation. ED staff took care of conscious sedation, once the patient was adequately sedated, the fracture was reduced using manual traction. Portable x-ray was available to monitor the reduction. Once adequate reduction was achieved, patient was placed in a well-padded 3 sided splint. A three-point mold was applied to the splint until it had hardened.   Portable x-ray was then again used to check that reduction was maintained. Adequate reduction maintained throughout the procedure, afterwards patient was awake and tolerated the procedure well. He was able to wiggle his toes and his sensation was normal and unchanged from prior exam.  Toes were warm and perfused.   · Patient can follow-up with Dr. Landry Jaramillo in 1 week in office to plan for definitive fixation  · Nonweightbearing to the left lower extremity  · Case discussed with Dr. Pedro Curtis

## 2022-01-20 NOTE — ED PROVIDER NOTES
ED Attending shared visit  CC: No    HPI:  1/20/22, Time: 11:57 AM CARMELLA Emerson is a 27 y.o. male presenting to the ED for ankle injury, beginning prior to arrival.  The complaint has been constant, moderate in severity, and worsened by movement of the left ankle. Patient states that he was at work when he slipped on ice twisting his left ankle. Patient denies head injury or loss of consciousness. Patient states when he twisted his ankle he had immediate pain, and was unable to bear any weight on his left foot. Patient has had no previous injuries or surgeries to this ankle. He denies paresthesia or loss of sensation to the left foot. No other complaints or concerns at this time. Review of Systems:   A complete review of systems was performed and pertinent positives and negatives are stated within HPI, all other systems reviewed and are negative.          --------------------------------------------- PAST HISTORY ---------------------------------------------  Past Medical History:  has no past medical history on file. Past Surgical History:  has no past surgical history on file. Social History:  reports that he has never smoked. His smokeless tobacco use includes chew. He reports that he does not drink alcohol and does not use drugs. Family History: family history is not on file. The patients home medications have been reviewed. Allergies: Patient has no known allergies. -------------------------------------------------- RESULTS -------------------------------------------------  All laboratory and radiology results have been personally reviewed by myself   LABS:  No results found for this visit on 01/20/22. RADIOLOGY:  Interpreted by Radiologist.  XR ANKLE LEFT (2 VIEWS)   Final Result   Interval reduction and placement of cast material.  Slightly improved   alignment. Fracture lucencies in the distal left fibula and tibia again   noted.          XR TIBIA FIBULA LEFT (2 VIEWS)   Final Result   Minimally displaced spiral fracture of the distal fibular diaphysis above the   tibiotalar joint line and minimally displaced fracture at the posterior   malleolus. XR ANKLE LEFT (MIN 3 VIEWS)   Final Result   Fractures at the distal fibula, posterior malleolus and medial widening of   the ankle mortise suggesting an injury to the deltoid ligament complex. Soft   tissue swelling.             ------------------------- NURSING NOTES AND VITALS REVIEWED ---------------------------   The nursing notes within the ED encounter and vital signs as below have been reviewed. BP (!) 141/86   Pulse 79   Resp 11   Wt 190 lb (86.2 kg)   SpO2 100%   BMI 25.77 kg/m²   Oxygen Saturation Interpretation: Normal      ---------------------------------------------------PHYSICAL EXAM--------------------------------------      Physical Exam  Constitutional:       Appearance: Normal appearance. He is normal weight. HENT:      Head: Normocephalic and atraumatic. Eyes:      Extraocular Movements: Extraocular movements intact. Pupils: Pupils are equal, round, and reactive to light. Cardiovascular:      Rate and Rhythm: Normal rate and regular rhythm. Pulses: Normal pulses. Heart sounds: Normal heart sounds. Pulmonary:      Effort: Pulmonary effort is normal. No respiratory distress. Breath sounds: Normal breath sounds. Abdominal:      General: There is no distension. Palpations: Abdomen is soft. Tenderness: There is no abdominal tenderness. Musculoskeletal:      Cervical back: Normal range of motion and neck supple. No tenderness. Right hip: Normal.      Left hip: Normal.      Right upper leg: Normal.      Left upper leg: Normal.      Right knee: Normal. No swelling or deformity. Left knee: Normal. No swelling or deformity. Left lower leg: Swelling, deformity, tenderness and bony tenderness present.       Right ankle:      Right Achilles Tendon: Normal. No defects. Left ankle: Swelling, deformity and ecchymosis present. Tenderness present over the lateral malleolus and medial malleolus. Left Achilles Tendon: Normal. No defects. Comments: Tenderness over the lower left tibia-fibula region as well as the medial and lateral aspect of the left ankle. Ecchymosis noted to the lateral aspect of the left ankle. Deformity noted to the left distal tibia-fibula, and ankle. Compartments are soft, and compressible. 2+ palpable distal pulses. Skin:     General: Skin is warm and dry. Capillary Refill: Capillary refill takes less than 2 seconds. Neurological:      General: No focal deficit present. Mental Status: He is alert. Psychiatric:         Behavior: Behavior normal.             ------------------------------ ED COURSE/MEDICAL DECISION MAKING----------------------  Medications   oxyCODONE-acetaminophen (PERCOCET) 5-325 MG per tablet 2 tablet (2 tablets Oral Given 1/20/22 1159)   fentaNYL (SUBLIMAZE) injection 25 mcg (25 mcg IntraMUSCular Given 1/20/22 1327)   ketamine (KETALAR) injection 129.3 mg (129.3 mg IntraVENous Given by Other 1/20/22 1513)         ED COURSE:   1400- Dr. Delaney Crooks orthopedic resident at the bedside to evaluate patient. 1450- Discussed case with Dr. Yuliya Ac. [address] [place] [address]-  Dr. Delaney Crooks and orthopedic resident at the bedside at this time to perform conscious sedation, and reduction. Medical Decision Making:    Patient presents to the emergency department for left ankle injury after fall prior to arrival.  Imaging was obtained. X-ray of left ankle showed a minimally displaced spiral fracture of the distal fibular diaphysis, and minimally displaced fracture of the posterior malleolus. Dr. Mcclellan Speaker the orthopedic resident Dr. Yuliya Ac placed patient under conscious sedation, and reduce the ankle. Patient is advised to follow-up with Dr. Bobby Harding in 1 week to discuss plan of care.   Patient was given information for occupational health to follow-up for his Workmen's Comp. Patient given instructions on splint care, and advised to not remove splint and keep it dry. Discussed plan and findings with patient, and he verbalized understanding. He was advised to use rest, ice, compression elevation. Return to the emergency department for worsening of symptoms. Counseling: The emergency provider has spoken with the patient and discussed todays results, in addition to providing specific details for the plan of care and counseling regarding the diagnosis and prognosis. Questions are answered at this time and they are agreeable with the plan.      --------------------------------- IMPRESSION AND DISPOSITION ---------------------------------    IMPRESSION  1. Other closed fracture of distal end of left fibula, initial encounter    2. Closed fracture of posterior malleolus of left tibia, initial encounter        DISPOSITION  Disposition: Discharge to home  Patient condition is good      NOTE: This report was transcribed using voice recognition software. Every effort was made to ensure accuracy; however, inadvertent computerized transcription errors may be present        NILAM Domingo CNP  01/20/22 1827    . exam     NILAM Domingo CNP  01/20/22 2027

## 2022-01-20 NOTE — Clinical Note
Gurdeep Madrigal was seen and treated in our emergency department on 1/20/2022. He may return to work on 01/31/2022. Until cleared by orthopedics to return to work. If you have any questions or concerns, please don't hesitate to call.       Sylwia Quesada, DO

## 2022-01-20 NOTE — ED NOTES
Bed: 15  Expected date:   Expected time:   Means of arrival:   Comments:  Ortho/Reduction     Scooter Cantor RN  01/20/22 6662

## 2022-01-26 ENCOUNTER — OFFICE VISIT (OUTPATIENT)
Dept: ORTHOPEDIC SURGERY | Age: 32
End: 2022-01-26
Payer: COMMERCIAL

## 2022-01-26 VITALS — BODY MASS INDEX: 25.73 KG/M2 | HEIGHT: 72 IN | WEIGHT: 190 LBS

## 2022-01-26 DIAGNOSIS — S82.842A CLOSED BIMALLEOLAR FRACTURE OF LEFT ANKLE, INITIAL ENCOUNTER: Primary | ICD-10-CM

## 2022-01-26 PROCEDURE — 99204 OFFICE O/P NEW MOD 45 MIN: CPT | Performed by: ORTHOPAEDIC SURGERY

## 2022-01-26 RX ORDER — OXYCODONE HYDROCHLORIDE AND ACETAMINOPHEN 5; 325 MG/1; MG/1
1 TABLET ORAL EVERY 8 HOURS PRN
Qty: 15 TABLET | Refills: 0 | Status: SHIPPED | OUTPATIENT
Start: 2022-01-26 | End: 2022-01-31 | Stop reason: SDUPTHER

## 2022-01-26 RX ORDER — OXYCODONE HYDROCHLORIDE AND ACETAMINOPHEN 5; 325 MG/1; MG/1
1 TABLET ORAL EVERY 6 HOURS PRN
COMMUNITY
End: 2022-01-26 | Stop reason: ALTCHOICE

## 2022-01-26 RX ORDER — ACETAMINOPHEN 500 MG
500 TABLET ORAL EVERY 6 HOURS PRN
COMMUNITY
End: 2022-09-21

## 2022-01-26 NOTE — PROGRESS NOTES
Chief Complaint:   Chief Complaint   Patient presents with    Ankle Injury     WC injury DOI 1/20/2022 Left ankle fracture. 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 Left leg bent behind him and he landed full weight on left ankle. Seen in [facility] ER, X-rays done, splinted. Out of Percocet. HPI     Beulah Taylor is a 27 y.o. 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 He was seen in the ED x-rays showing a displaced bimalleolar ankle fracture with lateral shift of the talus and a long spiral oblique fracture of the fibula above the syndesmosis. Patient had significant swelling, close reduction with anatomic alignment was performed and the patient presents today in his well fitted short leg splint. He complains of expected pain in the lower leg, denies numbness tingling in the toes, denies aggravation of pain with active or passive movement of the toes. No previous problems with the ankle no other joint complaints, specifically denies left knee hip or upper extremity symptoms. Patient did have a positive Covid test December 28, he has been asymptomatic. Allergies; medications; past medical, surgical, family, and social history; and problem list have been reviewed today and updated as indicated in this encounter - see below following Ortho specifics. Musculoskeletal: Healthy-appearing young adult male, isolated findings left lower extremity where there is a well fitted well-padded short leg splint in place, patient has good active and passive motion of the toes with intact motor or sensory circulation without paresthesias.     Radiologic Studies: X-rays of left ankle pre and postreduction noted above, initially a displaced bimalleolar fracture subluxation equivalent with a small posterior malleolar fragment in addition to the major issue of long spiral oblique fracture above the syndesmosis. Postreduction films confirmed overall anatomic alignment. ASSESSMENT/PLAN:    Sariah Lagos was seen today for ankle injury. Diagnoses and all orders for this visit:    Closed bimalleolar fracture of left ankle, initial encounter  -     oxyCODONE-acetaminophen (PERCOCET) 5-325 MG per tablet; Take 1 tablet by mouth every 8 hours as needed for Pain for up to 5 days. Diagnosis and treatment alternatives were reviewed with the patient, it is recommended to perform open reduction internal fixation although alignment at this time is satisfactory evidence indicates better likelihood of long-term result with anatomic reduction and internal fixation. Yunior Horn of the procedure itself likely risk benefits and probable outcome were detailed with the patient and his father in attendance, questions were asked answered and understood, at the patient's request we will proceed with this recommended procedure on a likely outpatient basis in the coming days. We will follow up post discharge to include x-rays 3 views left ankle. Positive Covid test 4 weeks ago as noted, this procedure is indicated on an urgent basis and the patient is currently asymptomatic from a respiratory perspective therefore considered indicated and safe to proceed as planned. Return in about 2 weeks (around 2/9/2022). Raysa Nieto MD    1/26/2022  2:45 PM      Patient Active Problem List   Diagnosis    Abdominal pain, rule out appendicitis     Colitis    Lower abdominal pain       Past Medical History:   Diagnosis Date    Ankle fracture, left     For OR 1/28/22    COVID 12/28/2021       No past surgical history on file. Current Outpatient Medications   Medication Sig Dispense Refill    oxyCODONE-acetaminophen (PERCOCET) 5-325 MG per tablet Take 1 tablet by mouth every 8 hours as needed for Pain for up to 5 days.  15 tablet 0    acetaminophen (TYLENOL) 500 MG tablet Take 500 mg by mouth every 6 hours as needed for Pain       No current facility-administered medications for this visit. No Known Allergies    Social History     Socioeconomic History    Marital status: Single     Spouse name: None    Number of children: None    Years of education: None    Highest education level: None   Occupational History    None   Tobacco Use    Smoking status: Former Smoker     Quit date: 2012     Years since quitting: 10.0    Smokeless tobacco: Current User     Types: Chew   Vaping Use    Vaping Use: Never used   Substance and Sexual Activity    Alcohol use: No     Comment: socially    Drug use: No    Sexual activity: None   Other Topics Concern    None   Social History Narrative    None     Social Determinants of Health     Financial Resource Strain:     Difficulty of Paying Living Expenses: Not on file   Food Insecurity:     Worried About Running Out of Food in the Last Year: Not on file    [place] of Food in the Last Year: Not on file   Transportation Needs:     Lack of Transportation (Medical): Not on file    Lack of Transportation (Non-Medical):  Not on file   Physical Activity:     Days of Exercise per Week: Not on file    Minutes of Exercise per Session: Not on file   Stress:     Feeling of Stress : Not on file   Social Connections:     Frequency of Communication with Friends and Family: Not on file    Frequency of Social Gatherings with Friends and Family: Not on file    Attends Denominational Services: Not on file    Active Member of Clubs or Organizations: Not on file    Attends Club or Organization Meetings: Not on file    Marital Status: Not on file   Intimate Partner Violence:     Fear of Current or Ex-Partner: Not on file    Emotionally Abused: Not on file    Physically Abused: Not on file    Sexually Abused: Not on file   Housing Stability:     Unable to Pay for Housing in the Last Year: Not on file    Number of [place] in the Last Year: Not on file    Unstable Housing in the Last Year: Not on file       No family history on file. Review of Systems  As follows except as previously noted in HPI:  Constitutional: Negative for chills, diaphoresis, fatigue, fever and unexpected weight change. Respiratory: Negative for cough, shortness of breath and wheezing. Cardiovascular: Negative for chest pain and palpitations. Neurological: Negative for dizziness, syncope, cephalgia. GI / : negative  Musculoskeletal: see HPI       Objective:   Physical Exam   Constitutional: Oriented to person, place, and time. and appears well-developed and well-nourished. :   Head: Normocephalic and atraumatic. Eyes: EOM are normal.   Neck: Neck supple. Cardiovascular: Normal rate and regular rhythm. Pulmonary/Chest: Effort normal. No stridor. No respiratory distress, no wheezes. Abdominal:  No abnormal distension. Neurological: Alert and oriented to person, place, and time. Skin: Skin is warm and dry. Psychiatric: Normal mood and affect.  Behavior is normal. Thought content normal.

## 2022-01-26 NOTE — PROGRESS NOTES
[facility] PRE-ADMISSION TESTING INSTRUCTIONS    The Preadmission Testing patient is instructed accordingly using the following criteria (check applicable):    ARRIVAL INSTRUCTIONS:  [x] Parking the day of Surgery is located in the Main Entrance lot. Upon entering the door, make an immediate right to the surgery reception desk    [x] Bring photo ID and insurance card    [] Bring in a copy of Living will or Durable Power of  papers. [x] Please be sure to arrange for responsible adult to provide transportation to and from the hospital    [x] Please arrange for responsible adult to be with you for the 24 hour period post procedure due to having anesthesia      GENERAL INSTRUCTIONS:    [x] Nothing by mouth after midnight, including gum, candy, mints or water    [x] You may brush your teeth, but do not swallow any water    [x] Take medications as instructed with 1-2 oz of water    [] Stop herbal supplements and vitamins 5 days prior to procedure    [] Follow preop dosing of blood thinners per physician instructions    [] Take 1/2 dose of evening insulin, but no insulin after midnight    [] No oral diabetic medications after midnight    [] If diabetic and have low blood sugar or feel symptomatic, take 1-2oz apple juice only    [] Bring inhalers day of surgery    [] Bring C-PAP/ Bi-Pap day of surgery    [] Bring urine specimen day of surgery    [x] Shower or bath with soap, lather and rinse well, AM of Surgery, no lotion, powders or creams to surgical site    [] Follow bowel prep as instructed per surgeon    [x] No tobacco products within 24 hours of surgery     [x] No alcohol or illegal drug use within 24 hours of surgery.     [x] Jewelry, body piercing's, eyeglasses, contact lenses and dentures are not permitted into surgery (bring cases)      [] Please do not wear any nail polish, make up or hair products on the day of surgery    [x] You can expect a call the business day prior to procedure to notify you if your arrival time changes    [x] If you receive a survey after surgery we would greatly appreciate your comments    [] Parent/guardian of a minor must accompany their child and remain on the premises  the entire time they are under our care     [] Pediatric patients may bring favorite toy, blanket or comfort item with them    [] A caregiver or family member must remain with the patient during their stay if they are mentally handicapped, have dementia, disoriented or unable to use a call light or would be a safety concern if left unattended    [x] Please notify surgeon if you develop any illness between now and time of surgery (cold, cough, sore throat, fever, nausea, vomiting) or any signs of infections  including skin, wounds, and dental.    []  The Outpatient Pharmacy is available to fill your prescription here on your day of surgery, ask your preop nurse for details    [] Other instructions    EDUCATIONAL MATERIALS PROVIDED:    [] PAT Preoperative Education Packet/Booklet     [] Medication List    [] Transfusion bracelet applied with instructions    [] Shower with soap, lather and rinse well, and use CHG wipes provided the evening before surgery as instructed    [] Incentive spirometer with instructions        Have you been tested for COVID  No           Have you been told you were positive for COVID Yes  Have you had any known exposure to someone that is positive for COVID No  Do you have a cough                   No              Do you have shortness of breath No                 Do you have a sore throat            No                Are you having chills                    No                Are you having muscle aches. No                    Please come to the hospital wearing a mask and have your significant other wear a mask as well. Both of you should check your temperature before leaving to come here,  if it is 100 or higher please call [phone] for instruction.

## 2022-01-26 NOTE — PROGRESS NOTES
Surgical Procedure: LEFT ANKLE OPEN REDUCTION INTERNAL FIXATION BIMALLEOLAR FRACTURE (20108), Needs: Yue Meade, Anesthesia: General, Date: Friday, January 28, 2022, Time: 7:30 am, Place: [facility], Surgeon: Eloina Hong. Eyad. Medications reviewed with the patient / holding no medications for this procedure. Pre Op Instructions reviewed with the patient and patient's father. Informed patient: A hospital nurse will contact him with instructions for the day of surgery. The patient expresses understanding and is in agreement with the plan.       Pre / Post Op Instructions Care Of Ankle d/w patient and father:  - Limit activity to BRP only  - NWB LLE w/ crutches  - Keep LLE elevated on pillows at all times at the level of the heart or slightly higher  - Ice bag to ankle  - Take pain medication as prescribed  - Take Tylenol for less pain    Post Op Appointment: Tuesday, February 8 th at 9:45 am

## 2022-01-26 NOTE — PROGRESS NOTES
Patient was positive for Covid on 12/28/2021. Per anesthesia guidelines, case should be postponed 6 weeks from the date of the positive test.  Marylou at Dr. Oakley Persons office notified and will have Dr. Rafael Farr write a note stating procedure is medically necessary to do prior to the end of 6 weeks. No preop covid testing needed.

## 2022-01-27 ENCOUNTER — ANESTHESIA EVENT (OUTPATIENT)
Dept: OPERATING ROOM | Age: 32
End: 2022-01-27
Payer: COMMERCIAL

## 2022-01-27 ENCOUNTER — TELEPHONE (OUTPATIENT)
Dept: ORTHOPEDIC SURGERY | Age: 32
End: 2022-01-27

## 2022-01-27 ENCOUNTER — PREP FOR PROCEDURE (OUTPATIENT)
Dept: ORTHOPEDIC SURGERY | Age: 32
End: 2022-01-27

## 2022-01-27 DIAGNOSIS — Z01.818 PRE-OP EVALUATION: Primary | ICD-10-CM

## 2022-01-27 RX ORDER — SODIUM CHLORIDE 9 MG/ML
25 INJECTION, SOLUTION INTRAVENOUS PRN
Status: CANCELLED | OUTPATIENT
Start: 2022-01-27

## 2022-01-27 RX ORDER — SODIUM CHLORIDE 0.9 % (FLUSH) 0.9 %
10 SYRINGE (ML) INJECTION EVERY 12 HOURS SCHEDULED
Status: CANCELLED | OUTPATIENT
Start: 2022-01-27

## 2022-01-27 RX ORDER — SODIUM CHLORIDE 0.9 % (FLUSH) 0.9 %
10 SYRINGE (ML) INJECTION PRN
Status: CANCELLED | OUTPATIENT
Start: 2022-01-27

## 2022-01-27 NOTE — H&P (VIEW-ONLY)
Chief Complaint:        Chief Complaint   Patient presents with    Ankle Injury       WC injury DOI 1/20/2022 Left ankle fracture. 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 Left leg bent behind him and he landed full weight on left ankle. Seen in [facility] ER, X-rays done, splinted. Out of Percocet.          DAYNA Douglas is a 27 y.o. 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 He was seen in the ED x-rays showing a displaced bimalleolar ankle fracture with lateral shift of the talus and a long spiral oblique fracture of the fibula above the syndesmosis. Patient had significant swelling, close reduction with anatomic alignment was performed and the patient presents today in his well fitted short leg splint. He complains of expected pain in the lower leg, denies numbness tingling in the toes, denies aggravation of pain with active or passive movement of the toes.   No previous problems with the ankle no other joint complaints, specifically denies left knee hip or upper extremity symptoms.     Patient did have a positive Covid test December 28, he has been asymptomatic.     Allergies; medications; past medical, surgical, family, and social history; and problem list have been reviewed today and updated as indicated in this encounter - see below following Ortho specifics.     Musculoskeletal: Healthy-appearing young adult male, isolated findings left lower extremity where there is a well fitted well-padded short leg splint in place, patient has good active and passive motion of the toes with intact motor or sensory circulation without paresthesias.     Radiologic Studies: X-rays of left ankle pre and postreduction noted above, initially a displaced bimalleolar fracture subluxation equivalent with a small posterior malleolar fragment in addition to the major issue of long spiral oblique fracture above the syndesmosis. Postreduction films confirmed overall anatomic alignment.     ASSESSMENT/PLAN:          Closed bimalleolar fracture of left ankle, initial encounter       Diagnosis and treatment alternatives were reviewed with the patient, it is recommended to perform open reduction internal fixation although alignment at this time is satisfactory evidence indicates better likelihood of long-term result with anatomic reduction and internal fixation. Manjit Mcintyre of the procedure itself likely risk benefits and probable outcome were detailed with the patient and his father in attendance, questions were asked answered and understood, at the patient's request we will proceed with this recommended procedure on a likely outpatient basis.     Positive Covid test 4 weeks ago as noted, this procedure is indicated on an urgent basis and the patient is currently asymptomatic from a respiratory perspective therefore considered indicated and safe to proceed as planned. Patient Active Problem List   Diagnosis    Abdominal pain, rule out appendicitis     Colitis    Lower abdominal pain         Past Medical History        Past Medical History:   Diagnosis Date    Ankle fracture, left       For OR 1/28/22    COVID 12/28/2021            Past Surgical History   No past surgical history on file.        Current Facility-Administered Medications          Current Outpatient Medications   Medication Sig Dispense Refill    oxyCODONE-acetaminophen (PERCOCET) 5-325 MG per tablet Take 1 tablet by mouth every 8 hours as needed for Pain for up to 5 days.  15 tablet 0    acetaminophen (TYLENOL) 500 MG tablet Take 500 mg by mouth every 6 hours as needed for Pain          No current facility-administered medications for this visit.            No Known Allergies     Social History   Social History            Socioeconomic History    Marital status: Single       Spouse name: None    Number of children: None    Years of education: None    Highest education level: None   Occupational History    None   Tobacco Use    Smoking status: Former Smoker       Quit date: 2012       Years since quitting: 10.0    Smokeless tobacco: Current User       Types: Chew   Vaping Use    Vaping Use: Never used   Substance and Sexual Activity    Alcohol use: No       Comment: socially    Drug use: No    Sexual activity: None   Other Topics Concern    None   Social History Narrative    None      Social Determinants of Health          Financial Resource Strain:     Difficulty of Paying Living Expenses: Not on file   Food Insecurity:     Worried About Running Out of Food in the Last Year: Not on file    Silverio of Food in the Last Year: Not on file   Transportation Needs:     Lack of Transportation (Medical): Not on file    Lack of Transportation (Non-Medical):  Not on file   Physical Activity:     Days of Exercise per Week: Not on file    Minutes of Exercise per Session: Not on file   Stress:     Feeling of Stress : Not on file   Social Connections:     Frequency of Communication with Friends and Family: Not on file    Frequency of Social Gatherings with Friends and Family: Not on file    Attends Congregation Services: Not on file    Active Member of [address] [place] [address] or Organizations: Not on file    Attends Club or Organization Meetings: Not on file    Marital Status: Not on file   Intimate Partner Violence:     Fear of Current or Ex-Partner: Not on file    Emotionally Abused: Not on file    Physically Abused: Not on file    Sexually Abused: Not on file   Housing Stability:     Unable to Pay for Housing in the Last Year: Not on file    Number of [place] in the Last Year: Not on file    Unstable Housing in the Last Year: Not on file            Family History   No family history on file.          Review of Systems  As follows except as previously noted in HPI:  Constitutional: Negative for chills, diaphoresis, fatigue, fever and unexpected weight change. Respiratory: Negative for cough, shortness of breath and wheezing. Cardiovascular: Negative for chest pain and palpitations. Neurological: Negative for dizziness, syncope, cephalgia. GI / : negative  Musculoskeletal: see HPI         Objective:   Physical Exam   Constitutional: Oriented to person, place, and time. and appears well-developed and well-nourished. :   Head: Normocephalic and atraumatic. Eyes: EOM are normal.   Neck: Neck supple. Cardiovascular: Normal rate and regular rhythm. Pulmonary/Chest: Effort normal. No stridor. No respiratory distress, no wheezes. Abdominal:  No abnormal distension. Neurological: Alert and oriented to person, place, and time. Skin: Skin is warm and dry. Psychiatric: Normal mood and affect.  Behavior is normal. Thought content normal.

## 2022-01-27 NOTE — TELEPHONE ENCOUNTER
1/27/2022 2:30 pm Tona Lesch RN from [facility] [phone] phoned the office to ask: IN regards to surgery, ankle fracture for tomorrow. Question: Does doctor want to do a peripheral nerve block? If so, will need an order entered. 1/27/2022 2:50 pm Informed Dr. Nael Copeland of above. Per Dr. Nael Copeland: No peripheral nerve block    1/27/2022 2:56 pm Follow up phone call to [facility] [phone] / Message left on voice mail: No peripheral nerve block per [facility].

## 2022-01-28 ENCOUNTER — ANESTHESIA (OUTPATIENT)
Dept: OPERATING ROOM | Age: 32
End: 2022-01-28
Payer: COMMERCIAL

## 2022-01-28 ENCOUNTER — APPOINTMENT (OUTPATIENT)
Dept: GENERAL RADIOLOGY | Age: 32
End: 2022-01-28
Attending: ORTHOPAEDIC SURGERY
Payer: COMMERCIAL

## 2022-01-28 ENCOUNTER — HOSPITAL ENCOUNTER (OUTPATIENT)
Age: 32
Setting detail: OUTPATIENT SURGERY
Discharge: HOME | End: 2022-01-28
Attending: ORTHOPAEDIC SURGERY | Admitting: ORTHOPAEDIC SURGERY
Payer: COMMERCIAL

## 2022-01-28 VITALS
TEMPERATURE: 97.8 F | HEIGHT: 72 IN | OXYGEN SATURATION: 97 % | WEIGHT: 185 LBS | HEART RATE: 76 BPM | BODY MASS INDEX: 25.06 KG/M2 | SYSTOLIC BLOOD PRESSURE: 114 MMHG | RESPIRATION RATE: 16 BRPM | DIASTOLIC BLOOD PRESSURE: 64 MMHG

## 2022-01-28 VITALS — OXYGEN SATURATION: 100 % | TEMPERATURE: 96.8 F | SYSTOLIC BLOOD PRESSURE: 105 MMHG | DIASTOLIC BLOOD PRESSURE: 51 MMHG

## 2022-01-28 LAB — SARS-COV-2, NAAT: NOT DETECTED

## 2022-01-28 PROCEDURE — 6360000002 HC RX W HCPCS: Performed by: ORTHOPAEDIC SURGERY

## 2022-01-28 PROCEDURE — 6360000002 HC RX W HCPCS: Performed by: ANESTHESIOLOGY

## 2022-01-28 PROCEDURE — 3600000004 HC SURGERY LEVEL 4 BASE: Performed by: ORTHOPAEDIC SURGERY

## 2022-01-28 PROCEDURE — 27814 TREATMENT OF ANKLE FRACTURE: CPT | Performed by: ORTHOPAEDIC SURGERY

## 2022-01-28 PROCEDURE — 7100000010 HC PHASE II RECOVERY - FIRST 15 MIN: Performed by: ORTHOPAEDIC SURGERY

## 2022-01-28 PROCEDURE — 2500000003 HC RX 250 WO HCPCS: Performed by: ORTHOPAEDIC SURGERY

## 2022-01-28 PROCEDURE — 3600000014 HC SURGERY LEVEL 4 ADDTL 15MIN: Performed by: ORTHOPAEDIC SURGERY

## 2022-01-28 PROCEDURE — C1713 ANCHOR/SCREW BN/BN,TIS/BN: HCPCS | Performed by: ORTHOPAEDIC SURGERY

## 2022-01-28 PROCEDURE — 7100000001 HC PACU RECOVERY - ADDTL 15 MIN: Performed by: ORTHOPAEDIC SURGERY

## 2022-01-28 PROCEDURE — 7100000000 HC PACU RECOVERY - FIRST 15 MIN: Performed by: ORTHOPAEDIC SURGERY

## 2022-01-28 PROCEDURE — 2720000010 HC SURG SUPPLY STERILE: Performed by: ORTHOPAEDIC SURGERY

## 2022-01-28 PROCEDURE — 2500000003 HC RX 250 WO HCPCS: Performed by: NURSE ANESTHETIST, CERTIFIED REGISTERED

## 2022-01-28 PROCEDURE — 87081 CULTURE SCREEN ONLY: CPT

## 2022-01-28 PROCEDURE — 3700000001 HC ADD 15 MINUTES (ANESTHESIA): Performed by: ORTHOPAEDIC SURGERY

## 2022-01-28 PROCEDURE — 87635 SARS-COV-2 COVID-19 AMP PRB: CPT

## 2022-01-28 PROCEDURE — 76942 ECHO GUIDE FOR BIOPSY: CPT | Performed by: ANESTHESIOLOGY

## 2022-01-28 PROCEDURE — 7100000011 HC PHASE II RECOVERY - ADDTL 15 MIN: Performed by: ORTHOPAEDIC SURGERY

## 2022-01-28 PROCEDURE — 6360000002 HC RX W HCPCS: Performed by: NURSE ANESTHETIST, CERTIFIED REGISTERED

## 2022-01-28 PROCEDURE — 6370000000 HC RX 637 (ALT 250 FOR IP): Performed by: ANESTHESIOLOGY

## 2022-01-28 PROCEDURE — 2709999900 HC NON-CHARGEABLE SUPPLY: Performed by: ORTHOPAEDIC SURGERY

## 2022-01-28 PROCEDURE — 3700000000 HC ANESTHESIA ATTENDED CARE: Performed by: ORTHOPAEDIC SURGERY

## 2022-01-28 PROCEDURE — 2580000003 HC RX 258: Performed by: NURSE ANESTHETIST, CERTIFIED REGISTERED

## 2022-01-28 PROCEDURE — 3209999900 FLUORO FOR SURGICAL PROCEDURES

## 2022-01-28 DEVICE — SCREW BNE L18MM DIA2.7MM CORT S STL ST FULL THRD FOR SM: Type: IMPLANTABLE DEVICE | Site: ANKLE | Status: FUNCTIONAL

## 2022-01-28 DEVICE — SCREW BNE L12MM DIA3.5MM CORT S STL ST NONCANNULATED LOK: Type: IMPLANTABLE DEVICE | Site: ANKLE | Status: FUNCTIONAL

## 2022-01-28 DEVICE — SCREW BNE L14MM DIA3.5MM CORT S STL ST NONCANNULATED LOK: Type: IMPLANTABLE DEVICE | Site: ANKLE | Status: FUNCTIONAL

## 2022-01-28 DEVICE — PLATE BNE L117MM 10 H S STL 1/3 TBLR LOK COMPR W/ CLLR FOR: Type: IMPLANTABLE DEVICE | Site: ANKLE | Status: FUNCTIONAL

## 2022-01-28 DEVICE — SCREW BNE L14MM DIA4MM CANC S STL ST CANN NONLOCKING FULL: Type: IMPLANTABLE DEVICE | Site: ANKLE | Status: FUNCTIONAL

## 2022-01-28 RX ORDER — BUPIVACAINE HYDROCHLORIDE 5 MG/ML
INJECTION, SOLUTION EPIDURAL; INTRACAUDAL PRN
Status: DISCONTINUED | OUTPATIENT
Start: 2022-01-28 | End: 2022-01-28 | Stop reason: ALTCHOICE

## 2022-01-28 RX ORDER — DEXAMETHASONE SODIUM PHOSPHATE 4 MG/ML
INJECTION, SOLUTION INTRA-ARTICULAR; INTRALESIONAL; INTRAMUSCULAR; INTRAVENOUS; SOFT TISSUE PRN
Status: DISCONTINUED | OUTPATIENT
Start: 2022-01-28 | End: 2022-01-28 | Stop reason: SDUPTHER

## 2022-01-28 RX ORDER — ONDANSETRON 2 MG/ML
4 INJECTION INTRAMUSCULAR; INTRAVENOUS
Status: DISCONTINUED | OUTPATIENT
Start: 2022-01-28 | End: 2022-01-28 | Stop reason: HOSPADM

## 2022-01-28 RX ORDER — MIDAZOLAM HYDROCHLORIDE 2 MG/2ML
1 INJECTION, SOLUTION INTRAMUSCULAR; INTRAVENOUS PRN
Status: DISCONTINUED | OUTPATIENT
Start: 2022-01-28 | End: 2022-01-28 | Stop reason: HOSPADM

## 2022-01-28 RX ORDER — SODIUM CHLORIDE 9 MG/ML
INJECTION, SOLUTION INTRAVENOUS CONTINUOUS PRN
Status: DISCONTINUED | OUTPATIENT
Start: 2022-01-28 | End: 2022-01-28 | Stop reason: SDUPTHER

## 2022-01-28 RX ORDER — OXYCODONE HYDROCHLORIDE AND ACETAMINOPHEN 5; 325 MG/1; MG/1
1 TABLET ORAL PRN
Status: COMPLETED | OUTPATIENT
Start: 2022-01-28 | End: 2022-01-28

## 2022-01-28 RX ORDER — SODIUM CHLORIDE 0.9 % (FLUSH) 0.9 %
10 SYRINGE (ML) INJECTION PRN
Status: DISCONTINUED | OUTPATIENT
Start: 2022-01-28 | End: 2022-01-28 | Stop reason: HOSPADM

## 2022-01-28 RX ORDER — SODIUM CHLORIDE 0.9 % (FLUSH) 0.9 %
10 SYRINGE (ML) INJECTION EVERY 12 HOURS SCHEDULED
Status: DISCONTINUED | OUTPATIENT
Start: 2022-01-28 | End: 2022-01-28 | Stop reason: HOSPADM

## 2022-01-28 RX ORDER — ROPIVACAINE HYDROCHLORIDE 5 MG/ML
60 INJECTION, SOLUTION EPIDURAL; INFILTRATION; PERINEURAL ONCE
Status: COMPLETED | OUTPATIENT
Start: 2022-01-28 | End: 2022-01-28

## 2022-01-28 RX ORDER — MIDAZOLAM HYDROCHLORIDE 1 MG/ML
INJECTION INTRAMUSCULAR; INTRAVENOUS PRN
Status: DISCONTINUED | OUTPATIENT
Start: 2022-01-28 | End: 2022-01-28 | Stop reason: SDUPTHER

## 2022-01-28 RX ORDER — ROPIVACAINE HYDROCHLORIDE 5 MG/ML
INJECTION, SOLUTION EPIDURAL; INFILTRATION; PERINEURAL
Status: COMPLETED | OUTPATIENT
Start: 2022-01-28 | End: 2022-01-28

## 2022-01-28 RX ORDER — MEPERIDINE HYDROCHLORIDE 25 MG/ML
12.5 INJECTION INTRAMUSCULAR; INTRAVENOUS; SUBCUTANEOUS
Status: DISCONTINUED | OUTPATIENT
Start: 2022-01-28 | End: 2022-01-28 | Stop reason: HOSPADM

## 2022-01-28 RX ORDER — FENTANYL CITRATE 50 UG/ML
INJECTION, SOLUTION INTRAMUSCULAR; INTRAVENOUS PRN
Status: DISCONTINUED | OUTPATIENT
Start: 2022-01-28 | End: 2022-01-28 | Stop reason: SDUPTHER

## 2022-01-28 RX ORDER — MORPHINE SULFATE 2 MG/ML
1 INJECTION, SOLUTION INTRAMUSCULAR; INTRAVENOUS EVERY 5 MIN PRN
Status: DISCONTINUED | OUTPATIENT
Start: 2022-01-28 | End: 2022-01-28 | Stop reason: HOSPADM

## 2022-01-28 RX ORDER — PROPOFOL 10 MG/ML
INJECTION, EMULSION INTRAVENOUS PRN
Status: DISCONTINUED | OUTPATIENT
Start: 2022-01-28 | End: 2022-01-28 | Stop reason: SDUPTHER

## 2022-01-28 RX ORDER — MORPHINE SULFATE 2 MG/ML
2 INJECTION, SOLUTION INTRAMUSCULAR; INTRAVENOUS EVERY 5 MIN PRN
Status: DISCONTINUED | OUTPATIENT
Start: 2022-01-28 | End: 2022-01-28 | Stop reason: HOSPADM

## 2022-01-28 RX ORDER — OXYCODONE HYDROCHLORIDE AND ACETAMINOPHEN 5; 325 MG/1; MG/1
2 TABLET ORAL PRN
Status: COMPLETED | OUTPATIENT
Start: 2022-01-28 | End: 2022-01-28

## 2022-01-28 RX ORDER — SODIUM CHLORIDE 9 MG/ML
25 INJECTION, SOLUTION INTRAVENOUS PRN
Status: DISCONTINUED | OUTPATIENT
Start: 2022-01-28 | End: 2022-01-28 | Stop reason: HOSPADM

## 2022-01-28 RX ORDER — GLYCOPYRROLATE 1 MG/5 ML
SYRINGE (ML) INTRAVENOUS PRN
Status: DISCONTINUED | OUTPATIENT
Start: 2022-01-28 | End: 2022-01-28 | Stop reason: SDUPTHER

## 2022-01-28 RX ORDER — ONDANSETRON 2 MG/ML
INJECTION INTRAMUSCULAR; INTRAVENOUS PRN
Status: DISCONTINUED | OUTPATIENT
Start: 2022-01-28 | End: 2022-01-28 | Stop reason: SDUPTHER

## 2022-01-28 RX ADMIN — SODIUM CHLORIDE: 9 INJECTION, SOLUTION INTRAVENOUS at 08:31

## 2022-01-28 RX ADMIN — ROPIVACAINE HYDROCHLORIDE 30 ML: 5 INJECTION, SOLUTION EPIDURAL; INFILTRATION; PERINEURAL at 07:43

## 2022-01-28 RX ADMIN — SODIUM CHLORIDE: 9 INJECTION, SOLUTION INTRAVENOUS at 07:26

## 2022-01-28 RX ADMIN — PROPOFOL 200 MG: 10 INJECTION, EMULSION INTRAVENOUS at 07:35

## 2022-01-28 RX ADMIN — Medication 0.2 MG: at 08:00

## 2022-01-28 RX ADMIN — OXYCODONE HYDROCHLORIDE AND ACETAMINOPHEN 1 TABLET: 5; 325 TABLET ORAL at 10:17

## 2022-01-28 RX ADMIN — HYDROMORPHONE HYDROCHLORIDE 0.5 MG: 1 INJECTION, SOLUTION INTRAMUSCULAR; INTRAVENOUS; SUBCUTANEOUS at 09:41

## 2022-01-28 RX ADMIN — MIDAZOLAM 2 MG: 1 INJECTION INTRAMUSCULAR; INTRAVENOUS at 07:28

## 2022-01-28 RX ADMIN — MIDAZOLAM 2 MG: 1 INJECTION INTRAMUSCULAR; INTRAVENOUS at 07:03

## 2022-01-28 RX ADMIN — ROPIVACAINE HYDROCHLORIDE 60 ML: 5 INJECTION, SOLUTION EPIDURAL; INFILTRATION; PERINEURAL at 07:05

## 2022-01-28 RX ADMIN — ONDANSETRON 4 MG: 2 INJECTION INTRAMUSCULAR; INTRAVENOUS at 08:44

## 2022-01-28 RX ADMIN — Medication 2000 MG: at 07:29

## 2022-01-28 RX ADMIN — DEXAMETHASONE SODIUM PHOSPHATE 8 MG: 4 INJECTION INTRA-ARTICULAR; INTRALESIONAL; INTRAMUSCULAR; INTRAVENOUS; SOFT TISSUE at 07:38

## 2022-01-28 RX ADMIN — FENTANYL CITRATE 100 MCG: 50 INJECTION, SOLUTION INTRAMUSCULAR; INTRAVENOUS at 07:35

## 2022-01-28 ASSESSMENT — PULMONARY FUNCTION TESTS
PIF_VALUE: 3
PIF_VALUE: 4
PIF_VALUE: 14
PIF_VALUE: 14
PIF_VALUE: 10
PIF_VALUE: 14
PIF_VALUE: 13
PIF_VALUE: 3
PIF_VALUE: 3
PIF_VALUE: 14
PIF_VALUE: 14
PIF_VALUE: 13
PIF_VALUE: 14
PIF_VALUE: 14
PIF_VALUE: 1
PIF_VALUE: 13
PIF_VALUE: 13
PIF_VALUE: 14
PIF_VALUE: 4
PIF_VALUE: 14
PIF_VALUE: 16
PIF_VALUE: 10
PIF_VALUE: 13
PIF_VALUE: 1
PIF_VALUE: 14
PIF_VALUE: 13
PIF_VALUE: 14
PIF_VALUE: 10
PIF_VALUE: 14
PIF_VALUE: 0
PIF_VALUE: 14
PIF_VALUE: 14
PIF_VALUE: 1
PIF_VALUE: 10
PIF_VALUE: 14
PIF_VALUE: 5
PIF_VALUE: 14
PIF_VALUE: 6
PIF_VALUE: 14
PIF_VALUE: 6
PIF_VALUE: 13
PIF_VALUE: 13
PIF_VALUE: 3
PIF_VALUE: 3
PIF_VALUE: 14
PIF_VALUE: 13
PIF_VALUE: 14
PIF_VALUE: 10
PIF_VALUE: 3
PIF_VALUE: 14
PIF_VALUE: 3
PIF_VALUE: 13
PIF_VALUE: 14
PIF_VALUE: 13
PIF_VALUE: 14
PIF_VALUE: 14
PIF_VALUE: 10
PIF_VALUE: 14
PIF_VALUE: 3
PIF_VALUE: 14
PIF_VALUE: 6
PIF_VALUE: 14
PIF_VALUE: 13
PIF_VALUE: 14
PIF_VALUE: 3
PIF_VALUE: 3
PIF_VALUE: 4
PIF_VALUE: 13
PIF_VALUE: 12
PIF_VALUE: 14
PIF_VALUE: 12
PIF_VALUE: 14
PIF_VALUE: 13
PIF_VALUE: 14
PIF_VALUE: 13
PIF_VALUE: 13
PIF_VALUE: 6
PIF_VALUE: 14
PIF_VALUE: 3
PIF_VALUE: 13
PIF_VALUE: 14
PIF_VALUE: 4
PIF_VALUE: 14
PIF_VALUE: 13
PIF_VALUE: 3
PIF_VALUE: 3
PIF_VALUE: 14
PIF_VALUE: 1
PIF_VALUE: 8
PIF_VALUE: 3
PIF_VALUE: 13

## 2022-01-28 ASSESSMENT — PAIN - FUNCTIONAL ASSESSMENT: PAIN_FUNCTIONAL_ASSESSMENT: 0-10

## 2022-01-28 ASSESSMENT — PAIN SCALES - GENERAL
PAINLEVEL_OUTOF10: 7
PAINLEVEL_OUTOF10: 0
PAINLEVEL_OUTOF10: 2
PAINLEVEL_OUTOF10: 7
PAINLEVEL_OUTOF10: 6

## 2022-01-28 ASSESSMENT — PAIN DESCRIPTION - LOCATION
LOCATION: FOOT
LOCATION: ANKLE

## 2022-01-28 ASSESSMENT — PAIN DESCRIPTION - PAIN TYPE
TYPE: SURGICAL PAIN
TYPE: SURGICAL PAIN

## 2022-01-28 ASSESSMENT — PAIN DESCRIPTION - DESCRIPTORS
DESCRIPTORS: DISCOMFORT
DESCRIPTORS: ACHING;DISCOMFORT;SORE

## 2022-01-28 ASSESSMENT — PAIN DESCRIPTION - ORIENTATION
ORIENTATION: LEFT
ORIENTATION: LEFT

## 2022-01-28 NOTE — ANESTHESIA PRE PROCEDURE
Department of Anesthesiology  Preprocedure Note       Name:  Claudia Douglas   Age:  27 y.o.  :  1990                                          MRN:  [id number]         Date:  2022      Surgeon: Sara Burger):  Alejandro Lambert MD    Procedure: Procedure(s):  LEFT ANKLE OPEN REDUCTION INTERNAL FIXATION BIMALLEOLAR ANKLE FRACTURE    Medications prior to admission:   Prior to Admission medications    Medication Sig Start Date End Date Taking? Authorizing Provider   oxyCODONE-acetaminophen (PERCOCET) 5-325 MG per tablet Take 1 tablet by mouth every 8 hours as needed for Pain for up to 5 days. 22 Yes Alejandro Lambert MD   acetaminophen (TYLENOL) 500 MG tablet Take 500 mg by mouth every 6 hours as needed for Pain   Yes Historical Provider, MD       Current medications:    Current Facility-Administered Medications   Medication Dose Route Frequency Provider Last Rate Last Admin    0.9 % sodium chloride infusion  25 mL IntraVENous PRN Alejandro Lambert MD        ceFAZolin (ANCEF) 2000 mg in sterile water 20 mL IV syringe  2,000 mg IntraVENous On Call to MD Russ        sodium chloride flush 0.9 % injection 10 mL  10 mL IntraVENous 2 times per day Alejandro Lambert MD        sodium chloride flush 0.9 % injection 10 mL  10 mL IntraVENous PRN Alejandro Lambert MD           Allergies:  No Known Allergies    Problem List:    Patient Active Problem List   Diagnosis Code    Abdominal pain, rule out appendicitis  R10.9    Colitis K52.9    Lower abdominal pain R10.30       Past Medical History:        Diagnosis Date    Ankle fracture, left     For OR 22    COVID 2021       Past Surgical History:  History reviewed. No pertinent surgical history.     Social History:    Social History     Tobacco Use    Smoking status: Former Smoker     Quit date:      Years since quitting: 10.0    Smokeless tobacco: Current User     Types: Chew   Substance Use Topics    Alcohol use: No     Comment: socially                                Ready to quit: Not Answered  Counseling given: Not Answered      Vital Signs (Current):   Vitals:    01/26/22 1058 01/28/22 0614   Weight: 185 lb (83.9 kg) 185 lb (83.9 kg)   Height: 6' (1.829 m) 6' (1.829 m)                                              BP Readings from Last 3 Encounters:   01/20/22 (!) 141/86   04/06/21 120/80   04/05/19 110/78       NPO Status:  > 8hrs                                                                               BMI:   Wt Readings from Last 3 Encounters:   01/28/22 185 lb (83.9 kg)   01/26/22 190 lb (86.2 kg)   01/20/22 190 lb (86.2 kg)     Body mass index is 25.09 kg/m². CBC:   Lab Results   Component Value Date    WBC 12.7 04/06/2021    RBC 4.89 04/06/2021    HGB 15.5 04/06/2021    HCT 44.3 04/06/2021    MCV 90.6 04/06/2021    RDW 12.3 04/06/2021     04/06/2021       CMP:   Lab Results   Component Value Date     04/06/2021    K 4.2 04/06/2021     04/06/2021    CO2 26 04/06/2021    BUN 10 04/06/2021    CREATININE 0.9 04/06/2021    GFRAA >60 04/06/2021    LABGLOM >60 04/06/2021    GLUCOSE 105 04/06/2021    PROT 7.6 04/06/2021    CALCIUM 9.3 04/06/2021    BILITOT 0.8 04/06/2021    ALKPHOS 55 04/06/2021    AST 15 04/06/2021    ALT 31 04/06/2021       POC Tests: No results for input(s): POCGLU, POCNA, POCK, POCCL, POCBUN, POCHEMO, POCHCT in the last 72 hours.     Coags: No results found for: PROTIME, INR, APTT    HCG (If Applicable): No results found for: PREGTESTUR, PREGSERUM, HCG, HCGQUANT     ABGs: No results found for: PHART, PO2ART, JJE8CXK, HOZ9AWG, BEART, G0PATKPZ     Type & Screen (If Applicable):  No results found for: LABABO, LABRH    Drug/Infectious Status (If Applicable):  No results found for: HIV, HEPCAB    COVID-19 Screening (If Applicable):   Lab Results   Component Value Date    COVID19 Not Detected 01/28/2022    COVID19 Not Detected 04/06/2021           Anesthesia Evaluation  Patient summary reviewed and Nursing notes reviewed no history of anesthetic complications:   Airway: Mallampati: II  TM distance: >3 FB   Neck ROM: full  Mouth opening: > = 3 FB Dental: normal exam         Pulmonary:Negative Pulmonary ROS and normal exam  breath sounds clear to auscultation                             Cardiovascular:  Exercise tolerance: good (>4 METS),           Rhythm: regular  Rate: normal                    Neuro/Psych:   Negative Neuro/Psych ROS              GI/Hepatic/Renal: Neg GI/Hepatic/Renal ROS            Endo/Other:                      ROS comment: Ankle fx Abdominal:             Vascular: negative vascular ROS. Other Findings:             Anesthesia Plan      general and regional     ASA 1       Induction: intravenous. MIPS: Postoperative opioids intended, Prophylactic antiemetics administered and Postoperative trial extubation. Anesthetic plan and risks discussed with patient. Plan discussed with CRNA. Brian Jacobs DO   1/28/2022    Pt seen and evaluated pre-procedure. Risks and benefits of anesthetic plan discussed as per custom.    NILAM Gautam - CRNA

## 2022-01-28 NOTE — OP NOTE
Operative Note      Patient: Ashley Roque  YOB: 1990  MRN: [id number]    Date of Procedure: 1/28/2022    Pre-Op Diagnosis: BIMALLEOLAR ANKLE FRACTURE    Post-Op Diagnosis: SAME       Procedure(s):  LEFT ANKLE OPEN REDUCTION INTERNAL FIXATION BIMALLEOLAR ANKLE FRACTURE    Surgeon(s):  Michaela Mary MD    Assistant:   Terri Groves DO    Anesthesia: General    Estimated Blood Loss (mL): less than 50     Complications: None    Specimens:   * No specimens in log *    Implants:  Implant Name Type Inv. Item Serial No.  Lot No. LRB No. Used Action   SCREW BNE L18MM DIA2.7MM PAO S STL ST FULL THRD FOR SM  SCREW BNE L18MM DIA2.7MM PAO S STL ST FULL THRD FOR SM  DEPU[employer]ES [employer]-WD  Left 1 Implanted   PLATE BNE [id number] 10 H S STL 1/3 TBLR WILFREDO COMPR W/ CLLR FOR  PLATE BNE [id number] 10 H S STL 1/3 TBLR WILFREDO COMPR W/ CLLR FOR  DEPUY SYNTHES USA-WD  Left 1 Implanted   SCREW BNE L12MM DIA3.5MM PAO S STL ST NONCANNULATED WILFREDO  SCREW BNE L12MM DIA3.5MM PAO S STL ST NONCANNULATED WILFREDO  DEPUY SYNTHES USA-WD  Left 5 Implanted   SCREW BNE L14MM DIA3.5MM PAO S STL ST NONCANNULATED WILFREDO  SCREW BNE L14MM DIA3.5MM PAO S STL ST NONCANNULATED WILFREDO  DEPUY SYNTHES USA-WD  Left 1 Implanted   SCREW BNE L14MM DIA4MM CANC S STL ST EDNA NONLOCKING FULL  SCREW BNE L14MM DIA4MM CANC S STL ST EDNA NONLOCKING FULL  DEPUY SYNTHES USA-WD  Left 1 Implanted         Drains: * No LDAs found *    Findings: Please see below    Detailed Description of Procedure: The patient was seen and identified outside the operative suite, in which the operative site was marked as appropriate by patient, surgeon, staff, and anesthesia. The patient was then taken into the operative suite, transferred to the operative table with all bony prominences and neurovascular structures well padded and protected. A tourniquet was placed high on the thigh of the operative extremity.  The patient was sedated under the care of the anesthesia team. The operative site was prepped and draped in standard sterile fashion. The tourniquet was set at 250 mmHg. After the time out, an incision was marked on the lateral side of the left leg to utilize a subcutaneous approach to the left fibula. An Esmarch was then applied and the tourniquet was elevated to 250 mmHg. The lateral side was then opened with a 15 blade through skin only. Careful dissection was used to ensure there was no injury to the superficial peroneal nerve throughout the case. The fracture site was then identified and debrided. Using a pointed reduction clamp fracture the was reduced. A 2.7 lag screw was then placed by drilling a 2.7 opening in the near cortex and 2.0 in the far cortex perpendicular to the distal portion of the fracture. Once these were in position, a countersunk 2.7-mm screw was placed. After this, a one third tubular plate was then placed on the lateral malleoli with 3.5 bicortical screws proximally and 4.0 cancellous screws distally. The plate and screws were confirmed in proper posotion and alignment under fluoroscopy     At this time the ankle was stressed under fluoroscopy. Subtle tilt of the talus was noted but no widening of the syndesmosis was observed. The medial clear space remained similar on stress views. AP, lateral, mortise views were obtained showing near anatomic reduction of the ankle. The wound was then copiously irrigated with sterile normal saline. An #0 Vicryl was used to close for deep tissue coverage over the plate. Subcutaneous tissue was closed with 2-0 Vicryl in an interrupted fashion. Skin was closed with 3-0 nylon in an interrupted fashion. Compartments were soft and compressible. Sterile dressings were placed as well as a well-padded stirrup splint. The patient was then reversed from anesthesia without complication and transferred back to the hospital room bed by multiple individuals in a safe fashion.  The patient was taken to the PACU in a

## 2022-01-28 NOTE — ANESTHESIA PROCEDURE NOTES
Peripheral Block    Patient location during procedure: pre-op  Staffing  Performed: anesthesiologist   Anesthesiologist: Zoraida York DO  Preanesthetic Checklist  Completed: patient identified, IV checked, site marked, risks and benefits discussed, surgical consent, monitors and equipment checked, pre-op evaluation, timeout performed, anesthesia consent given, oxygen available and patient being monitored  Peripheral Block  Patient position: supine  Prep: ChloraPrep  Patient monitoring: cardiac monitor, continuous pulse ox, frequent blood pressure checks and IV access  Block type: Sciatic  Laterality: left  Injection technique: single-shot  Guidance: ultrasound guided  Local infiltration: lidocaine  Infiltration strength: 1 %  Dose: 3 mL  Popliteal  Provider prep: mask and sterile gloves  Local infiltration: lidocaine  Needle  Needle gauge: 21 G  Needle length: 10 cm  Needle localization: ultrasound guidance  Assessment  Injection assessment: negative aspiration for heme, no paresthesia on injection and local visualized surrounding nerve on ultrasound  Paresthesia pain: none  Slow fractionated injection: yes  Hemodynamics: stable  Additional Notes  U/S [id number].  Time out performed.          Medications Administered  Ropivacaine (NAROPIN) injection 0.5%, 30 mL  Reason for block: post-op pain management and at surgeon's request

## 2022-01-28 NOTE — ANESTHESIA PROCEDURE NOTES
Peripheral Block    Patient location during procedure: pre-op  Staffing  Performed: anesthesiologist   Anesthesiologist: Yoana Cardona DO  Preanesthetic Checklist  Completed: patient identified, IV checked, site marked, risks and benefits discussed, surgical consent, monitors and equipment checked, pre-op evaluation, timeout performed, anesthesia consent given, oxygen available and patient being monitored  Peripheral Block  Patient position: supine  Prep: alcohol swabs  Patient monitoring: cardiac monitor, continuous pulse ox, frequent blood pressure checks and IV access  Block type: Saphenous  Laterality: left  Injection technique: single-shot  Guidance: ultrasound guided  Local infiltration: ropivacaine  Provider prep: mask and sterile gloves  Local infiltration: ropivacaine  Needle  Needle type: combined needle/nerve stimulator   Needle gauge: 22 G  Needle length: 5 cm  Needle localization: ultrasound guidance  Assessment  Injection assessment: negative aspiration for heme, no paresthesia on injection and local visualized surrounding nerve on ultrasound  Paresthesia pain: none  Slow fractionated injection: yes  Hemodynamics: stable  Additional Notes  Timeout performed  Medications Administered  Ropivacaine (NAROPIN) injection 0.5%, 30 mL  Reason for block: post-op pain management and at surgeon's request

## 2022-01-28 NOTE — INTERVAL H&P NOTE
Update History & Physical    The patient's History and Physical of January 27, 2022 was reviewed with the patient and I examined the patient. There was no change. The surgical site was confirmed by the patient and me. Plan: The risks, benefits, expected outcome, and alternative to the recommended procedure have been discussed with the patient. Patient understands and wants to proceed with the procedure.      Electronically signed by Willow Soria MD on 1/28/2022 at 7:25 AM

## 2022-01-28 NOTE — LETTER
[facility]  [address] [facility] [address]  Phone: [phone]             January 28, 2022    Patient: Geraldo Watson   YOB: 1990   Date of Visit: 1/28/2022       To Whom It May Concern:    Verito Morris was seen and treated in our facility on 1/28/2022 for surgery. If you have any questions, please call Dr. Yelitza Au.       Sincerely,       Elliott Tran RN         Signature:__________________________________

## 2022-01-28 NOTE — ANESTHESIA POSTPROCEDURE EVALUATION
Department of Anesthesiology  Postprocedure Note    Patient: Billy Stokes  MRN: [id number]  YOB: 1990  Date of evaluation: 1/28/2022  Time:  9:42 AM     Procedure Summary     Date: 01/28/22 Room / Location: [facility] OR 03 / [facility]    Anesthesia Start: 7809 Anesthesia Stop: 2658    Procedure: LEFT ANKLE OPEN REDUCTION INTERNAL FIXATION BIMALLEOLAR ANKLE FRACTURE (Left Ankle) Diagnosis: (BIMALLEOLAR ANKLE FRACTURE)    Surgeons: Lauri Michelle MD Responsible Provider: Keyonna Licea DO    Anesthesia Type: general, regional ASA Status: 1          Anesthesia Type: general, regional    Yair Phase I: Yair Score: 9    Yair Phase II:      Last vitals: Reviewed and per EMR flowsheets.        Anesthesia Post Evaluation    Patient location during evaluation: PACU  Patient participation: complete - patient participated  Level of consciousness: awake and alert  Airway patency: patent  Nausea & Vomiting: no nausea and no vomiting  Complications: no  Cardiovascular status: blood pressure returned to baseline  Respiratory status: acceptable  Hydration status: euvolemic

## 2022-01-29 LAB — MRSA CULTURE ONLY: NORMAL

## 2022-01-31 DIAGNOSIS — S82.842A CLOSED BIMALLEOLAR FRACTURE OF LEFT ANKLE, INITIAL ENCOUNTER: ICD-10-CM

## 2022-01-31 DIAGNOSIS — Z98.890 HISTORY OF ANKLE SURGERY: Primary | ICD-10-CM

## 2022-01-31 RX ORDER — OXYCODONE HYDROCHLORIDE AND ACETAMINOPHEN 5; 325 MG/1; MG/1
1 TABLET ORAL EVERY 8 HOURS PRN
Qty: 15 TABLET | Refills: 0 | Status: SHIPPED
Start: 2022-01-31 | End: 2022-02-08 | Stop reason: SDUPTHER

## 2022-01-31 NOTE — TELEPHONE ENCOUNTER
1/31/2022 8:45 am Patient phoned the office to report: Had ankle surgery on Friday, January 28 th. Did not receive a prescription for pain medication due to had medication left from a previous prescription. Patient is requesting a refill Percocet 5/325. Patient reports he is taking plain Tylenol which is ineffective for pain control. Send prescription to [facility] on [address].     Order pended

## 2022-01-31 NOTE — TELEPHONE ENCOUNTER
1/31/2022 1:35 pm After confirming receipt of order in epic / Phoned and informed patient: Request granted / prescription for Percocet refill e-scribed to [facility] on [address].

## 2022-02-03 ENCOUNTER — TELEPHONE (OUTPATIENT)
Dept: ORTHOPEDIC SURGERY | Age: 32
End: 2022-02-03

## 2022-02-03 NOTE — TELEPHONE ENCOUNTER
Patient's father Kandi Carvajal called. Two days after surgery 1/28/2022, ORIF Lt bimalleolar fracture, Tyler complained of pain left small toe. On inspection, is looked like Tyler had developed an abrasion on lateral side of left small toe and sore between fourth and fifth toe. Pain has improved. Spoke with TSB, most likely an abrasion from splint. Keep area clean and dry, continue with antibiotic ointment. FU here on 2/8/2022.

## 2022-02-08 ENCOUNTER — OFFICE VISIT (OUTPATIENT)
Dept: ORTHOPEDIC SURGERY | Age: 32
End: 2022-02-08

## 2022-02-08 VITALS — BODY MASS INDEX: 25.06 KG/M2 | WEIGHT: 185 LBS | HEIGHT: 72 IN

## 2022-02-08 DIAGNOSIS — S82.842G CLOSED BIMALLEOLAR FRACTURE OF LEFT ANKLE WITH DELAYED HEALING, SUBSEQUENT ENCOUNTER: Primary | ICD-10-CM

## 2022-02-08 DIAGNOSIS — Z98.890 HISTORY OF ANKLE SURGERY: Primary | ICD-10-CM

## 2022-02-08 DIAGNOSIS — S82.842A CLOSED BIMALLEOLAR FRACTURE OF LEFT ANKLE, INITIAL ENCOUNTER: ICD-10-CM

## 2022-02-08 PROCEDURE — 99024 POSTOP FOLLOW-UP VISIT: CPT | Performed by: ORTHOPAEDIC SURGERY

## 2022-02-08 RX ORDER — IBUPROFEN 200 MG
400 TABLET ORAL EVERY 6 HOURS PRN
COMMUNITY
End: 2022-09-21

## 2022-02-08 NOTE — PROGRESS NOTES
Sutures removed from left ankle. A walking boot was applied to the left lower leg. Use and care instructions were reviewed with patient.     Brace supplied by and billed for by [facility]

## 2022-02-08 NOTE — LETTER
[address] [facility]  [address] [address]  Phone: [phone]  Fax: [phone]    Renzo Hernandez MD        February 8, 2022     Patient: Hilary Watson   YOB: 1990   Date of Visit: 2/8/2022       To Whom It May Concern:     Tiffanie Goodrich was seen by me today for post-op follow up. He is to remain off work. His next follow-up visit is scheduled for 3/1/2022. If you have any questions or concerns, please don't hesitate to call.     Sincerely,        Renzo Hernandez MD

## 2022-02-08 NOTE — TELEPHONE ENCOUNTER
2/08/2022 1:40 pm Patient phoned the office / Message left on voice mail: Saw doctor today. Declined a refill pain medication. Now I regret it. Informed patient:  Will inform the doctor / Will call patient when prescription is ready for pickup    Order pended    OARRS Report printed, placed on your desk

## 2022-02-09 RX ORDER — OXYCODONE HYDROCHLORIDE AND ACETAMINOPHEN 5; 325 MG/1; MG/1
1 TABLET ORAL EVERY 8 HOURS PRN
Qty: 15 TABLET | Refills: 0 | Status: SHIPPED | OUTPATIENT
Start: 2022-02-09 | End: 2022-02-14

## 2022-02-09 NOTE — PROGRESS NOTES
Chief Complaint:   Chief Complaint   Patient presents with    Post-Op Check     [facility] Post-op ORIF left ankle 1/28/2022. c/o sores left fourth and fifth toes. Bhavik Soliman underwent uneventful production internal fixation of the left ankle lateral malleolus fracture on January 28, tolerated as an outpatient did have some difficulty with pain management postoperatively, says his pain has come down considerably now and managing it with Tylenol alone. He did develop a sore spot between the fourth and fifth toes. No other joint complaints. Allergies; medications; past medical, surgical, family, and social history; and problem list have been reviewed today and updated as indicated in this encounter seen below. Exam: Out of the postoperative splint the left ankle is well aligned showing expected generalized soft tissue swelling, calf mildly swollen nontender negative Homans, lateral incision is healing without significant erythema, no drainage. There is a wet ulcer between the fourth and fifth toes more on the fifth and the fourth, no active drainage nor of significant surrounding erythema. Radiographs: Three-view x-rays of left ankle today confirm anatomic restoration of the ankle mortise anatomic alignment of the fracture with lateral fixation intact, syndesmosis appears normal.    Liborio was seen today for post-op check. Diagnoses and all orders for this visit:    Closed bimalleolar fracture of left ankle with delayed healing, subsequent encounter  -     XR ANKLE LEFT (MIN 3 VIEWS)       Doing well, sutures removed, patient states he did not need a prescription for pain medicine today. He was fitted with a removable boot advised he may pursue active range of motion out of the boot when nonweightbearing only. Touchdown weightbearing okay in the boot. Follow-up in 3 weeks for clinical and three-view x-ray exams of the left ankle. Return in about 3 weeks (around 3/1/2022) for xray. Current Outpatient Medications   Medication Sig Dispense Refill    ibuprofen (ADVIL;MOTRIN) 200 MG tablet Take 400 mg by mouth every 6 hours as needed for Pain      acetaminophen (TYLENOL) 500 MG tablet Take 500 mg by mouth every 6 hours as needed for Pain       No current facility-administered medications for this visit. Patient Active Problem List   Diagnosis    Abdominal pain, rule out appendicitis     Colitis    Lower abdominal pain    Closed bimalleolar fracture of left ankle       Past Medical History:   Diagnosis Date    Ankle fracture, left     For OR 1/28/22    COVID 12/28/2021       Past Surgical History:   Procedure Laterality Date    ANKLE FRACTURE SURGERY Left 1/28/2022    LEFT ANKLE OPEN REDUCTION INTERNAL FIXATION BIMALLEOLAR ANKLE FRACTURE performed by Lana Cano MD at [facility] OR       No Known Allergies    Social History     Socioeconomic History    Marital status: Single     Spouse name: None    Number of children: None    Years of education: None    Highest education level: None   Occupational History    None   Tobacco Use    Smoking status: Former Smoker     Quit date: 2012     Years since quitting: 10.1    Smokeless tobacco: Current User     Types: Chew   Vaping Use    Vaping Use: Never used   Substance and Sexual Activity    Alcohol use: No     Comment: socially    Drug use: No    Sexual activity: None   Other Topics Concern    None   Social History Narrative    None     Social Determinants of Health     Financial Resource Strain:     Difficulty of Paying Living Expenses: Not on file   Food Insecurity:     Worried About Running Out of Food in the Last Year: Not on file    Silverio of Food in the Last Year: Not on file   Transportation Needs:     Lack of Transportation (Medical): Not on file    Lack of Transportation (Non-Medical):  Not on file   Physical Activity:     Days of Exercise per Week: Not on file    Minutes of Exercise per Session: Not on file

## 2022-03-01 ENCOUNTER — OFFICE VISIT (OUTPATIENT)
Dept: ORTHOPEDIC SURGERY | Age: 32
End: 2022-03-01

## 2022-03-01 VITALS — HEIGHT: 72 IN | WEIGHT: 185 LBS | BODY MASS INDEX: 25.06 KG/M2

## 2022-03-01 DIAGNOSIS — S82.842D CLOSED BIMALLEOLAR FRACTURE OF LEFT ANKLE WITH ROUTINE HEALING, SUBSEQUENT ENCOUNTER: Primary | ICD-10-CM

## 2022-03-01 PROCEDURE — 99024 POSTOP FOLLOW-UP VISIT: CPT | Performed by: ORTHOPAEDIC SURGERY

## 2022-03-01 NOTE — LETTER
[address] [facility]  [address] [address]  Phone: [phone]  Fax: [phone]    Trinidad Bocanegra MD        March 1, 2022     Patient: Nelson Diaz   YOB: 1990   Date of Visit: 3/1/2022       To Whom It May Concern: It is my medical opinion that Louise Bocanegra is to remain off work. He will be re-evaluated at his next appointment on 4/5/22. If you have any questions or concerns, please don't hesitate to call.     Sincerely,        Trinidad Bocanegra MD

## 2022-03-01 NOTE — PROGRESS NOTES
Chief Complaint:   Chief Complaint   Patient presents with    Ankle Injury     WC FU ORIF Left ankle bimalleolar fracture 1/28/2022. States he is doing well. Billy Stokes is 1 month after fixation of left ankle fracture, doing well at this point, has born little bit of weight, has tolerated out of the boot for active motion as well. He notes pain more at the medial side of the left ankle than lateral.  No other joint complaints. Allergies; medications; past medical, surgical, family, and social history; and problem list have been reviewed today and updated as indicated in this encounter seen below. Exam: Left ankle does show medial and lateral soft tissue swelling that is expected and benign, tender medially below the malleolus, lateral incision is healed without erythema or drainage. Tolerating ankle dorsiflexion just past neutral.  Good flexion without medial lateral laxity. Radiographs: Three-view x-rays of left ankle today confirm ongoing anatomic restoration of the ankle mortise, fibular fracture and hardware are intact without evidence of loosening or migration, syndesmotic space appears normal.    Liborio was seen today for ankle injury. Diagnoses and all orders for this visit:    Closed bimalleolar fracture of left ankle with routine healing, subsequent encounter  -     XR ANKLE LEFT (MIN 3 VIEWS)       Patient doing well, he may advance weightbearing to tolerance in the boot, continue removing it and begin using some stretch bands to work on dorsiflexion to tolerance. Stay off regular work although if light duty becomes available if nonweightbearing that may be considered in the meantime otherwise follow-up in 1 month for clinical and three-view x-ray exams of the left ankle. Return in about 1 month (around 4/1/2022) for xray.      Current Outpatient Medications   Medication Sig Dispense Refill    ibuprofen (ADVIL;MOTRIN) 200 MG tablet Take 400 mg by mouth every 6 hours as needed for Pain      acetaminophen (TYLENOL) 500 MG tablet Take 500 mg by mouth every 6 hours as needed for Pain       No current facility-administered medications for this visit. Patient Active Problem List   Diagnosis    Abdominal pain, rule out appendicitis     Colitis    Lower abdominal pain    Closed bimalleolar fracture of left ankle       Past Medical History:   Diagnosis Date    Ankle fracture, left     For OR 1/28/22    COVID 12/28/2021       Past Surgical History:   Procedure Laterality Date    ANKLE FRACTURE SURGERY Left 1/28/2022    LEFT ANKLE OPEN REDUCTION INTERNAL FIXATION BIMALLEOLAR ANKLE FRACTURE performed by Wiley Chavez MD at [facility] OR       No Known Allergies    Social History     Socioeconomic History    Marital status: Single     Spouse name: None    Number of children: None    Years of education: None    Highest education level: None   Occupational History    None   Tobacco Use    Smoking status: Former Smoker     Quit date: 2012     Years since quitting: 10.1    Smokeless tobacco: Current User     Types: Chew   Vaping Use    Vaping Use: Never used   Substance and Sexual Activity    Alcohol use: No     Comment: socially    Drug use: No    Sexual activity: None   Other Topics Concern    None   Social History Narrative    None     Social Determinants of Health     Financial Resource Strain:     Difficulty of Paying Living Expenses: Not on file   Food Insecurity:     Worried About Running Out of Food in the Last Year: Not on file    Silverio of Food in the Last Year: Not on file   Transportation Needs:     Lack of Transportation (Medical): Not on file    Lack of Transportation (Non-Medical):  Not on file   Physical Activity:     Days of Exercise per Week: Not on file    Minutes of Exercise per Session: Not on file   Stress:     Feeling of Stress : Not on file   Social Connections:     Frequency of Communication with Friends and Family: Not on file    Frequency of Social Gatherings with Friends and Family: Not on file    Attends Adventism Services: Not on file    Active Member of Clubs or Organizations: Not on file    Attends Club or Organization Meetings: Not on file    Marital Status: Not on file   Intimate Partner Violence:     Fear of Current or Ex-Partner: Not on file    Emotionally Abused: Not on file    Physically Abused: Not on file    Sexually Abused: Not on file   Housing Stability:     Unable to Pay for Housing in the Last Year: Not on file    Number of [place] in the Last Year: Not on file    Unstable Housing in the Last Year: Not on file       History reviewed. No pertinent family history. Review of Systems  As follows except as previously noted in HPI:  Constitutional: Negative for chills, diaphoresis, fatigue, fever and unexpected weight change. Respiratory: Negative for cough, shortness of breath and wheezing. Cardiovascular: Negative for chest pain and palpitations. Neurological: Negative for dizziness, syncope, cephalgia. GI / : negative  Musculoskeletal: see HPI       Objective:   Physical Exam   Constitutional: Oriented to person, place, and time. and appears well-developed and well-nourished. :   Head: Normocephalic and atraumatic. Eyes: EOM are normal.   Neck: Neck supple. Cardiovascular: Normal rate and regular rhythm. Pulmonary/Chest: Effort normal. No stridor. No respiratory distress, no wheezes. Abdominal:  No abnormal distension. Neurological: Alert and oriented to person, place, and time. Skin: Skin is warm and dry. Psychiatric: Normal mood and affect.  Behavior is normal. Thought content normal.    3/1/2022  5:49 PM

## 2022-04-05 ENCOUNTER — OFFICE VISIT (OUTPATIENT)
Dept: ORTHOPEDIC SURGERY | Age: 32
End: 2022-04-05

## 2022-04-05 VITALS — WEIGHT: 195 LBS | BODY MASS INDEX: 26.41 KG/M2 | HEIGHT: 72 IN

## 2022-04-05 DIAGNOSIS — S82.842D CLOSED BIMALLEOLAR FRACTURE OF LEFT ANKLE WITH ROUTINE HEALING, SUBSEQUENT ENCOUNTER: Primary | ICD-10-CM

## 2022-04-05 PROCEDURE — 99024 POSTOP FOLLOW-UP VISIT: CPT | Performed by: ORTHOPAEDIC SURGERY

## 2022-04-05 NOTE — PROGRESS NOTES
Chief Complaint:   Chief Complaint   Patient presents with    Ankle Injury     WC FU Left ankle injury 1/20/2022. ORIF left ankle fracture 1/28/2022. Ankle still very sore. Karon Ward is over 2 months after open reduction internal fixation of left ankle fracture, he is bearing full weight in the boot removing it for stretching exercises at home. Still having pain and very apprehensive about the potential for going back to his previous level of work. Having expected pain mostly at the medial side but again bearing full weight. Allergies; medications; past medical, surgical, family, and social history; and problem list have been reviewed today and updated as indicated in this encounter seen below. Exam: Left ankle is anatomically aligned and stable to stress, minimal if any medial swelling, no lateral swelling or tenderness, able to dorsiflex approximately 10 to 15 degrees. Radiographs: Three-view x-rays of left ankle confirm ongoing anatomic alignment of the long oblique distal fibular fracture, all hardware in good position, syndesmotic and ankle spaces are preserved symmetric. Arnulfoemelia Trinity was seen today for ankle injury. Diagnoses and all orders for this visit:    Closed bimalleolar fracture of left ankle with routine healing, subsequent encounter  -     XR ANKLE LEFT (MIN 3 VIEWS)  -     Ambulatory referral to Physical Therapy       Doing well, patient was converted to an air sport brace, he will also go to physical therapy work on strength and recovery of proprioception for confidence, follow-up in 6 weeks for clinical and x-ray exams of the left ankle possible return to work at that time. Return in about 6 weeks (around 5/17/2022) for xray.      Current Outpatient Medications   Medication Sig Dispense Refill    ibuprofen (ADVIL;MOTRIN) 200 MG tablet Take 400 mg by mouth every 6 hours as needed for Pain      acetaminophen (TYLENOL) 500 MG tablet Take 500 mg by mouth every 6 hours as needed for Pain       No current facility-administered medications for this visit. Patient Active Problem List   Diagnosis    Abdominal pain, rule out appendicitis     Colitis    Lower abdominal pain    Closed bimalleolar fracture of left ankle       Past Medical History:   Diagnosis Date    Ankle fracture, left     For OR 1/28/22    COVID 12/28/2021       Past Surgical History:   Procedure Laterality Date    ANKLE FRACTURE SURGERY Left 1/28/2022    LEFT ANKLE OPEN REDUCTION INTERNAL FIXATION BIMALLEOLAR ANKLE FRACTURE performed by Sariah Garcia MD at [facility] OR       No Known Allergies    Social History     Socioeconomic History    Marital status: Single     Spouse name: None    Number of children: None    Years of education: None    Highest education level: None   Occupational History    None   Tobacco Use    Smoking status: Former Smoker     Quit date: 2012     Years since quitting: 10.2    Smokeless tobacco: Current User     Types: Chew   Vaping Use    Vaping Use: Never used   Substance and Sexual Activity    Alcohol use: No     Comment: socially    Drug use: No    Sexual activity: None   Other Topics Concern    None   Social History Narrative    None     Social Determinants of Health     Financial Resource Strain:     Difficulty of Paying Living Expenses: Not on file   Food Insecurity:     Worried About Running Out of Food in the Last Year: Not on file    Silverio of Food in the Last Year: Not on file   Transportation Needs:     Lack of Transportation (Medical): Not on file    Lack of Transportation (Non-Medical):  Not on file   Physical Activity:     Days of Exercise per Week: Not on file    Minutes of Exercise per Session: Not on file   Stress:     Feeling of Stress : Not on file   Social Connections:     Frequency of Communication with Friends and Family: Not on file    Frequency of Social Gatherings with Friends and Family: Not on file    Attends Taoist Services: Not on file    Active Member of Clubs or Organizations: Not on file    Attends Club or Organization Meetings: Not on file    Marital Status: Not on file   Intimate Partner Violence:     Fear of Current or Ex-Partner: Not on file    Emotionally Abused: Not on file    Physically Abused: Not on file    Sexually Abused: Not on file   Housing Stability:     Unable to Pay for Housing in the Last Year: Not on file    Number of [place] in the Last Year: Not on file    Unstable Housing in the Last Year: Not on file       History reviewed. No pertinent family history. Review of Systems  As follows except as previously noted in HPI:  Constitutional: Negative for chills, diaphoresis, fatigue, fever and unexpected weight change. Respiratory: Negative for cough, shortness of breath and wheezing. Cardiovascular: Negative for chest pain and palpitations. Neurological: Negative for dizziness, syncope, cephalgia. GI / : negative  Musculoskeletal: see HPI       Objective:   Physical Exam   Constitutional: Oriented to person, place, and time. and appears well-developed and well-nourished. :   Head: Normocephalic and atraumatic. Eyes: EOM are normal.   Neck: Neck supple. Cardiovascular: Normal rate and regular rhythm. Pulmonary/Chest: Effort normal. No stridor. No respiratory distress, no wheezes. Abdominal:  No abnormal distension. Neurological: Alert and oriented to person, place, and time. Skin: Skin is warm and dry. Psychiatric: Normal mood and affect.  Behavior is normal. Thought content normal.    4/5/2022  3:42 PM

## 2022-04-05 NOTE — PROGRESS NOTES
An airsport ankle brace was applied to His Left ankle(s). Use and care of the brace were reviewed with the patient. The patient denied any issues with fit or comfort of the braces  Patient instructed to call our office if there are any issues with the braces.     Brace supplied by and billed for by [facility]

## 2022-04-11 ENCOUNTER — EVALUATION (OUTPATIENT)
Dept: PHYSICAL THERAPY | Age: 32
End: 2022-04-11
Payer: COMMERCIAL

## 2022-04-11 DIAGNOSIS — M25.572 ACUTE LEFT ANKLE PAIN: Primary | ICD-10-CM

## 2022-04-11 PROCEDURE — 97161 PT EVAL LOW COMPLEX 20 MIN: CPT | Performed by: PHYSICAL THERAPIST

## 2022-04-11 PROCEDURE — 97110 THERAPEUTIC EXERCISES: CPT | Performed by: PHYSICAL THERAPIST

## 2022-04-11 PROCEDURE — 97112 NEUROMUSCULAR REEDUCATION: CPT | Performed by: PHYSICAL THERAPIST

## 2022-04-11 NOTE — PROGRESS NOTES
[facility]   Phone: [phone]   Fax: [phone]      Physical Therapy Daily Treatment Note    Date: 2022  Patient Name: Annamaria Guajardo  : 1990   MRN: [id number]  DOInjury: 2022   Referring Provider: Frederick Garcia MD  [address] [place] [address]     Medical Diagnosis:     L ankle pain    Outcome Measure: LEFS 50%    S: pt reports to PT in L boot c L aknle pain  O:   Time 220-300     Visit 1 Repeat outcome measure at mid point and end. Pain 4/10     ROM impaired      Modalities            Manual            Stretch                  Exercise      4 way ankle  15x5s BTB NR   Ankle alphabet 1 set BTB NR   Plantarflexion/dorsiflexion stretch 4x30s Yoga strap TE                                                           NMR To improve balance for safe community and home ambulation    Resisted walk      FWD      BKWD      lat      March      Side stepping      Retro walk      Heel to toe      A:  Tolerated well. Above added to written HEP. Continue to progress strength and ROM to return to work.    P: Continue with rehab plan  Laury Gomes, PT DPT, PT [id number]    Treatment Charges: Mins Units   Initial Evaluation 15 1   Re-Evaluation     Ther Exercise         TE 10 1   Manual Therapy     MT     Ther Activities        TA     Gait Training          GT     Neuro Re-education NR 15 1   Modalities     Non-Billable Service Time     Other     Total Time/Units 40 3

## 2022-04-11 NOTE — PROGRESS NOTES
[address] [facility]   Phone: [phone]   Fax: [phone]         Date:  2022   Patient: Michael Aguayo  : 1990  MRN: [id number]  Referring Provider: Javi Walker MD  [address] [place] [address]     Medical Diagnosis:     L ankle pain    SUBJECTIVE:     Onset date: 2022    Onset: Sudden onset    Mechanism of Injury: pt reports to PT following fall and fx at work on ice current pain is 4/10 c decreased motion in boot. Pt reports increased pain c prolonged activity and standing and relief c rest. Pt works for [employer] as a  and loves to play golf which both motivate the pt to return to [employer]. Previous PT: none    Medical Management for Current Problem: ORIF    Chief complaint: pain, decreased motion, decreased mobility, inability / limited ability to use leg    Behavior: condition is getting better    Pain: intermittent  Current: 4/10     Best: 3/10     Worst:5/10    Symptom Type/Quality: sharp, aching  Location[de-identified] Ankle: medial side      Aggravated by: walking, standing, stairs, running    Relieved by: rest, reduced weightbearing    Imaging results: XR ANKLE LEFT (MIN 3 VIEWS)    Result Date: 2022  Radiology exam is complete. No Radiologist dictation. Please follow up with ordering provider.        Past Medical History  Past Medical History:   Diagnosis Date    Ankle fracture, left     For OR 22    COVID 2021     Past Surgical History:   Procedure Laterality Date    ANKLE FRACTURE SURGERY Left 2022    LEFT ANKLE OPEN REDUCTION INTERNAL FIXATION BIMALLEOLAR ANKLE FRACTURE performed by Javi Walker MD at [facility] OR       Medications:   Current Outpatient Medications   Medication Sig Dispense Refill    ibuprofen (ADVIL;MOTRIN) 200 MG tablet Take 400 mg by mouth every 6 hours as needed for Pain      acetaminophen (TYLENOL) 500 MG tablet Take 500 mg by mouth every 6 hours as needed for Pain       No current facility-administered medications for this visit. Occupation: . Physical demands include: lifting, carrying, pushing, pulling medium weighted items (20 - 50 lbs Occasionally). Status: full time    Exercise regimen: cardio    Hobbies: golfing    Patient Goals: pain relief, return to prior activity, full use of leg    Precautions/Contraindications: recent surgery    OBJECTIVE:     Observations: well nourished male    Inspection: demonstrates generalized pronated posture (forward head, protracted scapula, internally rotated shoulders, and flexed trunk and lower extremities)     Edema: mild medial    Gait: short step length, LLE boot    Joint/Motion:    Ankle:  Right:   AROM: WNL 20° Dorsiflexion,  70° Plantarflexion, 30° Inversion, 20° Eversion     Left:   AROM: limited 10° Dorsiflexion,  50° Plantarflexion, 15° Inversion, 10° Eversion      Strength: Ankle:  Right: Dorsiflexion 4/5, Plantarflexion 4/5, Inversion 4/5, Eversion 4/5  Left: Dorsiflexion 3/5, Plantarflexion 3/5, Inversion 3/5, Eversion 3/5    Palpation: Tender to palpation scar on lateral ankle         Special Tests/Functional Screens:   [] Anterior Drawer []+ / [] -  [x] Eversion Stress: [x]+ / [] -  [x] Finley Test []+ / [x] -     [] Tib-Fib Compression Test []+ / [] -    [x] Inversion Stress []+ / [x] -     [] Squeeze Test []+ / [] -   [] Alfa's Sign []+ / [] -   [] Other: []+ / []      Special test comments: concurrent c pattern of injury      ASSESSMENT     Outcome Measure:   Lower Extremity Functional Scale (LEFS) 50% impairment    Problems:    Pain reported 4/10   ROM decreased   Strength decreased    Decreased functional ability with walking, running, stairs, standing     Reason for Skilled Care: pt presents to PT c reduced ROM, stability and functional mobility following a fall at work on ice.    Pt will need skilled care to recover ROM, stability and functional mobility in a safe and effective manner in order to return to work with no increased risk of injury. Pt will need education on proper prognosis and progression of exercise    [x] There are no barriers affecting plan of care or recovery    [] Barriers to this patient's plan of care or recovery include. Domestic Concerns:  [x] No  [] Yes:    Short Term goals (2 weeks)   Decrease reported pain to 2/10   Increase ROM to 20° Dorsiflexion,  70° Plantarflexion, 30° Inversion, 20° Eversion    Increase Strength to 3+/5     Able to perform/complete the following functions/tasks:   a. Walk for 15 minute on a flat even surface c no more pain than 2/10 in order to demonstrate functional mobility and return to work. b. 15 stairs BLE c BHR c no more pain than 2/10 in order to demonstrate functional mobility and return to work. c. 15 STS in 30 seconds c no more pain than 2/10 in order to demonstrate functional mobility and return to work.  Lower Extremity Functional Scale (LEFS) 25% impairment    Long Term goals (4-6 weeks)   Decrease reported pain to 1/10   Increase ROM to 20° Dorsiflexion,  70° Plantarflexion, 30° Inversion, 20° Eversion    Increase strength to 4-/5   Able to complete the following functions/tasks:   a. Walk for 25 minute on a flat even surface c no more pain than 1/10 in order to demonstrate functional mobility and return to work. b. 25 stairs BLE c BHR c no more pain than 1/10 in order to demonstrate functional mobility and return to work. c. 15 Squats in 30 seconds c no more pain than 1/10 in order to demonstrate functional mobility and return to work.    LEFS 10% impairment   Independent with home exercise program (HEP)    Rehab Potential: [x] Good  [] Fair  [] Poor    PLAN       Treatment Plan:   [x] Therapeutic Exercise  [x] Therapeutic Activity  [x] Neuromuscular Re-education   [x] Gait Training  [x] Balance Training  [x] Aerobic conditioning  [x] Manual Therapy  [x] Massage/Fascial release   [x] Work/Sport specific activities    [x] Pain Neuroscience [x] Cold/hotpack  [x] Vasocompression  [x] Electrical Stimulation  [x] Lumbar/Cervical Traction  [x] Ultrasound   [] Iontophoresis: 4 mg/mL Dexamethasone Sodium Phosphate 40-80 mAmin  [x] Dry Needling      [x] Instruction in HEP      []  Medication allergies reviewed for use of Dexamethasone Sodium Phosphate 4mg/ml  with iontophoresis treatments. Patient is not allergic. The following CPT codes are likely to be used in the care of this patient: 24146 PT Evaluation: Low Complexity   18093 PT Re-Evaluation   93075 Therapeutic Exercise   95532 Neuromuscular Re-Education   30534 Therapeutic Activities   28088 Manual Therapy   38457 Group Therapy   34824 Mechanical Traction   94503 Gait Training   90658 Vasopneumatic Device    Electrical Stimulation      Suggested Professional Referral: [x] No  [] Yes:     Patient Education:  [x] Plans/Goals, Risks/Benefits discussed  [x] Home exercise program  Method of Education: [x] Verbal  [x] Demo  [x] Written  Comprehension of Education:  [x] Verbalizes understanding. [x] Demonstrates understanding. [] Needs Review. [] Demonstrates/verbalizes understanding of HEP/Ed previously given. Frequency: 1-2 days per week for 4-6 weeks    Patient understands diagnosis/prognosis and consents to treatment, plan and goals: [x] Yes    [] No     Thank you for the opportunity to work with your patient. If you have questions or comments, please contact me at numbers listed above. Electronically signed by: Leigh Wheatley, PT DPT, PT [id number]         Please sign Physician's Certification and return to: [address] [place] [address]  Dept: [phone]  Dept Fax: [phone]  Loc: [phone] Certification / Comments     Frequency/Duration 1-2 days per week for 4-6 weeks. Certification period from 4/11/2022  to 6/15/2022.     I have reviewed the Plan of Care established for skilled therapy services and certify that the services are required and that they will be provided while the patient is under my care.     Physician's Comments/Revisions:               Physician's Printed Name:                                           [de-identified] Signature:                                                               Date:

## 2022-04-12 ENCOUNTER — TREATMENT (OUTPATIENT)
Dept: PHYSICAL THERAPY | Age: 32
End: 2022-04-12
Payer: COMMERCIAL

## 2022-04-12 DIAGNOSIS — M25.572 ACUTE LEFT ANKLE PAIN: Primary | ICD-10-CM

## 2022-04-12 PROCEDURE — 97112 NEUROMUSCULAR REEDUCATION: CPT

## 2022-04-12 PROCEDURE — 97110 THERAPEUTIC EXERCISES: CPT

## 2022-04-12 NOTE — PROGRESS NOTES
[facility]   Phone: [phone]   Fax: [phone]      Physical Therapy Treatment Note    Date: 2022  Patient Name: Сергей Soto  : 1990   MRN: [id number]  DOInjury: 2022   Referring Provider: Juancarlos Stein MD  [address] [place] [address]     Medical Diagnosis:     L ankle pain    Outcome Measure: LEFS 50%    S: Pt arrived to PT with L Aircast and severely antalgic gait, reported no change since evaluation. O:   Time [id number]     Visit 2 Repeat outcome measure at mid point and end. Pain 4/10     ROM impaired      Modalities            Manual      Christopher AP, PA mobs x4 min Talus MT   Stretch      Plantarflexion/dorsiflexion stretch 4x30s Yoga strap TE   Self L ankle mob half kneel 3x20 with 3s hold With black Xband TE         Exercise      4 way ankle  15x5s Black Xband NR   Ankle alphabet  2 sets (x1 each way) BTB NR   L ankle INV/EV with Xband x20 each Black Xband NR         Squats      Lunges onto Airex x30 Single UE support NR   Step outs 4 way x20 each way Blue TB NR   Step downs 2x12 4 inch box NR   R toe taps on cone x30  NR                       NMR To improve balance for safe community and home ambulation    Resisted walk      FWD      BKWD      lat      March      Side stepping      Retro walk      Heel to toe        A:  Tolerated well. Progressed 4-way ankle exercises to black Xband to provide more ROM and increased resistance. Performed more standing exercises to begin CKC exercises and promote neuromuscular control of the L ankle during functional movements. P: Continue with rehab plan     Melecio Brewster.  Selvin Villalobos, PT  License number:  [id number]    Treatment Charges: Mins Units   Initial Evaluation     Re-Evaluation     Ther Exercise         TE 11 1   Manual Therapy     MT 4 0   Ther Activities        TA     Gait Training          GT     Neuro Re-education NR 28 2   Modalities     Non-Billable Service Time     Other     Total Time/Units 43 3

## 2022-04-19 ENCOUNTER — TREATMENT (OUTPATIENT)
Dept: PHYSICAL THERAPY | Age: 32
End: 2022-04-19
Payer: COMMERCIAL

## 2022-04-19 DIAGNOSIS — M25.572 ACUTE LEFT ANKLE PAIN: Primary | ICD-10-CM

## 2022-04-19 PROCEDURE — 97530 THERAPEUTIC ACTIVITIES: CPT | Performed by: PHYSICAL THERAPIST

## 2022-04-19 PROCEDURE — 97110 THERAPEUTIC EXERCISES: CPT | Performed by: PHYSICAL THERAPIST

## 2022-04-19 PROCEDURE — 97112 NEUROMUSCULAR REEDUCATION: CPT | Performed by: PHYSICAL THERAPIST

## 2022-04-19 NOTE — PROGRESS NOTES
[address] [facility]   Phone: [phone]   Fax: [phone]      Physical Therapy Treatment Note    Date: 2022  Patient Name: Crispin Stock  : 1990   MRN: [id number]  DOInjury: 2022   Referring Provider: Yeison Baptiste MD  [address] [place] [address]     Medical Diagnosis:     L ankle pain    Outcome Measure: LEFS 50%    S: Pt arrived to PT with L Aircast and somewhat antalgic gait, reported no change since evaluation. O:   Time 220-300     Visit 3 Repeat outcome measure at mid point and end. Pain 4/10     ROM impaired      Modalities            Manual      Stretch      Plantarflexion/dorsiflexion stretch 4x30s Yoga strap TE         Exercise      Step outs 4 way x20 each way Blue TB NR   Step downs 2x12 4 inch box NR   Step ups  2x15 BLE 8\" ant/lat TA   Balance board [id number]   NR                 NMR To improve balance for safe community and home ambulation    Resisted walk      FWD      BKWD      lat      March      Side stepping      Retro walk      Heel to toe        A:  Tolerated well.   To step ups and eccentric step downs to further initiate real life for return to work    P: Continue with rehab plan     Leigh Wheatley PT  Ulices Easley SPT    Treatment Charges: Mins Units   Initial Evaluation     Re-Evaluation     Ther Exercise         TE 10 1   Manual Therapy     MT     Ther Activities        TA 10 1   Gait Training          GT     Neuro Re-education NR 20 1   Modalities     Non-Billable Service Time     Other     Total Time/Units 40 0

## 2022-04-21 ENCOUNTER — TREATMENT (OUTPATIENT)
Dept: PHYSICAL THERAPY | Age: 32
End: 2022-04-21
Payer: COMMERCIAL

## 2022-04-21 DIAGNOSIS — M25.572 ACUTE LEFT ANKLE PAIN: Primary | ICD-10-CM

## 2022-04-21 PROCEDURE — 97110 THERAPEUTIC EXERCISES: CPT

## 2022-04-21 PROCEDURE — 97112 NEUROMUSCULAR REEDUCATION: CPT

## 2022-04-21 NOTE — PROGRESS NOTES
[address] [facility]   Phone: [phone]   Fax: [phone]      Physical Therapy Treatment Note    Date: 2022  Patient Name: Crispin Stock  : 1990   MRN: [id number]  DOInjury: 2022   Referring Provider: Yesion Baptiste MD  [address] [place] [address]     Medical Diagnosis:     L ankle pain    Outcome Measure: LEFS 50%    S: Pt arrived to PT with L Aircast and somewhat antalgic gait, the pt reports good adherence to HEP.   O:   Time 3587-9561     Visit 4 Repeat outcome measure at mid point and end. Pain 4/10     ROM impaired      Modalities            Manual      Stretch      Plantarflexion/dorsiflexion stretch 3x30s Yoga strap TE         Exercise      4 way ankle  15x5s Black Xband NR   [id number] On BOSU NR   Lunges onto Airex 2x12 Single UE support NR   Step outs 4 way x20 each way Blue TB NR   Step downs 2x20 4 inch box NR   BOSU CW and CCW 2x10 each way BUE support on rail NR           NMR To improve balance for safe community and home ambulation    Resisted walk      FWD      BKWD      lat      March      Side stepping      Retro walk      Heel to toe        A:  Tolerated well. The pt was able to perform JS band 4-way ankles with cues for improved technique and avoiding placing the heel on the mat to improve neuromuscular control. The pt reported some concern with the his brace and asked if he could wear a brace he had from home, asked the pt to bring his brace from home in the next session. P: Continue with rehab plan. Yana Coombs.  Reza Tiwari, PT  License number:  [id number]    Treatment Charges: Mins Units   Initial Evaluation     Re-Evaluation     Ther Exercise         TE 10 1   Manual Therapy     MT     Ther Activities        TA     Gait Training          GT     Neuro Re-education NR 30 2   Modalities     Non-Billable Service Time     Other     Total Time/Units 40 3

## 2022-04-25 ENCOUNTER — TREATMENT (OUTPATIENT)
Dept: PHYSICAL THERAPY | Age: 32
End: 2022-04-25
Payer: COMMERCIAL

## 2022-04-25 DIAGNOSIS — M25.572 ACUTE LEFT ANKLE PAIN: Primary | ICD-10-CM

## 2022-04-25 PROCEDURE — 97112 NEUROMUSCULAR REEDUCATION: CPT

## 2022-04-25 PROCEDURE — 97110 THERAPEUTIC EXERCISES: CPT

## 2022-04-25 NOTE — PROGRESS NOTES
[address] [facility] [address] [facility]   Phone: [phone]   Fax: [phone]      Physical Therapy Treatment Note    Date: 2022  Patient Name: Annamaria Guajardo  : 1990   MRN: [id number]  DOInjury: 2022   Referring Provider: Frederick Garcia MD  [address] [place] [address]     Medical Diagnosis:   L ankle pain    Outcome Measure: LEFS 50%    S:  Patient reports current pain level 4/10 upon arrival to PT.     O:   Time 220-300     Visit 5 Repeat outcome measure at mid point and end. Pain 4/10     ROM impaired      Modalities            Manual      Stretch      Plantarflexion/dorsiflexion stretch 3x30s Yoga strap TE         Exercise      4 way ankle  15x5s Black Xband NR   [id number] On BOSU NR   Lunges onto Airex 2x12 Single UE support NR   Step outs 4 way x20 each way Blue TB NR   Step downs 2x20 6 inch box NR   BOSU CW and CCW x20 each way BUE support on rail NR   BOSU FWD/BWD x20 each way BUE support on rail NR         Step-ups c LLE remaining on step and march c RLE x30  6\" NR   Marches on foam x30 c hold during SLS NR                       A:  Tolerated well. Progressed patient c step-overs on 6\" step. Cues in order to ensure proper form and pacing techniques. Patient demonstrates mm fatigue during today's treatment session. Continue to progress c LE stability. P: Continue with rehab plan.   Irlanda Tolbert PTA [id number]    Treatment Charges: Mins Units   Initial Evaluation     Re-Evaluation     Ther Exercise         TE 10 1   Manual Therapy     MT     Ther Activities        TA     Gait Training          GT     Neuro Re-education NR 30 2   Modalities     Non-Billable Service Time     Other     Total Time/Units 40 3

## 2022-04-28 ENCOUNTER — TREATMENT (OUTPATIENT)
Dept: PHYSICAL THERAPY | Age: 32
End: 2022-04-28
Payer: COMMERCIAL

## 2022-04-28 DIAGNOSIS — M25.572 ACUTE LEFT ANKLE PAIN: Primary | ICD-10-CM

## 2022-04-28 PROCEDURE — 97112 NEUROMUSCULAR REEDUCATION: CPT

## 2022-04-28 NOTE — PROGRESS NOTES
[address] [facility]   Phone: [phone]   Fax: [phone]      Physical Therapy Treatment Note    Date: 2022  Patient Name: Elizabet Carmen  : 1990   MRN: [id number]  DOInjury: 2022   Referring Provider: Grisel Fitzgerald MD  [address] [place] [address]     Medical Diagnosis:   L ankle pain    Outcome Measure: LEFS 50%    S:  Patient reports to therapy and states he was sore after previous session. O:   Time 5474-7509     Visit 6 Repeat outcome measure at mid point and end. Pain 4/10     ROM impaired      Modalities            Manual      Stretch       TE         Exercise      4 way ankle  15x5s Black Xband NR   [id number] On BOSU NR   Lunges onto Airex x30 Single UE support NR   Step outs 4 way x20 each way Blue TB NR   Step downs 2x20 6 inch box NR   BOSU CW and CCW x20 each way BUE support on rail NR   BOSU FWD/BWD x20 each way BUE support on rail NR         6\" NR   Marches on foam 2 mins  c hold during SLS NR         Resisted walking  10x each Purple band  NR         Wobbe board x30 each direction Flex/STS NR         Foam Tandem stance  2 min each   NR   Foam NBOS 3 mins  c head turns  NR                             A:  Tolerated well. Patient progressed this session to consist of resisted ambulation, wobble board stretches and proprioceptive activity. No c/o of pain during session but demonstrates instability during SL activity. P: Continue with rehab plan.   Manueljeff [facility]Louis [facility] PTA [id number]    Treatment Charges: Mins Units   Initial Evaluation     Re-Evaluation     Ther Exercise         TE     Manual Therapy     MT     Ther Activities        TA     Gait Training          GT     Neuro Re-education NR 40 3   Modalities     Non-Billable Service Time     Other     Total Time/Units 40 3

## 2022-05-03 ENCOUNTER — TREATMENT (OUTPATIENT)
Dept: PHYSICAL THERAPY | Age: 32
End: 2022-05-03
Payer: COMMERCIAL

## 2022-05-03 DIAGNOSIS — M25.572 ACUTE LEFT ANKLE PAIN: Primary | ICD-10-CM

## 2022-05-03 PROCEDURE — 97112 NEUROMUSCULAR REEDUCATION: CPT

## 2022-05-03 PROCEDURE — 97110 THERAPEUTIC EXERCISES: CPT

## 2022-05-03 NOTE — PROGRESS NOTES
[address] [facility] [address] [facility]   Phone: [phone]   Fax: [phone]      Physical Therapy Treatment Note    Date: 5/3/2022  Patient Name: Cynthia Singh  : 1990   MRN: [id number]  DOInjury: 2022   Referring Provider: Sheyla Paulson MD  [address] [place] [address]     Medical Diagnosis:   L ankle pain    Outcome Measure: LEFS 50%    S:  Patient continues to have c/o of stiffness in the L ankle. O:   Time 140-220     Visit 7 Repeat outcome measure at mid point and end. Pain 4/10     ROM impaired      Modalities            Manual      Stretch      Plantarflexion/dorsiflexion stretch 3x30s Yoga strap TE   Self L ankle mob half kneel 3x20 with 3s hold With black Xband TE         Exercise      Black Xband NR   On BOSU NR   Lunges onto Airex  -Fwd/Lat x30 Single UE support  No shoe NR   Blue TB NR   6 inch box NR   BUE support on rail NR   BUE support on rail NR         6\" NR   Marches on foam 2 mins  c hold during SLS NR         Resisted walking  10x each Purple band  NR         Wobbe board x30 each direction Flex/STS NR          NR   c head turns  NR         Treadmill walking  5 mins  1.0 MPH NR         Half kneel DF self mob c yellow band  x20 10s holds  TE   Standing step DF self mob c yellow band  x20 10s holds  TE         Step up onto foam  x30 No shoes NR                       A:  Tolerated well. Patient instructed in self mobilizations in order to improve DF d/t stiffness. Added to I HEPs this session. Patient demonstrates good understanding of how to perform. Progressed c treadmill walking in order to improve gait mechanics and reduce LE compensations. P: Continue with rehab plan.   Lori Anthony PTA [id number]    Treatment Charges: Mins Units   Initial Evaluation     Re-Evaluation     Ther Exercise         TE 10 1   Manual Therapy     MT     Ther Activities        TA     Gait Training          GT     Neuro Re-education NR 30 2   Modalities     Non-Billable Service Time     Other     Total Time/Units 40 3

## 2022-05-05 ENCOUNTER — TREATMENT (OUTPATIENT)
Dept: PHYSICAL THERAPY | Age: 32
End: 2022-05-05
Payer: COMMERCIAL

## 2022-05-05 DIAGNOSIS — M25.572 ACUTE LEFT ANKLE PAIN: Primary | ICD-10-CM

## 2022-05-05 PROCEDURE — 97112 NEUROMUSCULAR REEDUCATION: CPT

## 2022-05-05 PROCEDURE — 97140 MANUAL THERAPY 1/> REGIONS: CPT

## 2022-05-05 PROCEDURE — 97110 THERAPEUTIC EXERCISES: CPT

## 2022-05-05 NOTE — PROGRESS NOTES
[address] [facility]   Phone: [phone]   Fax: [phone]      Physical Therapy Treatment Note    Date: 2022  Patient Name: Michael Aguayo  : 1990   MRN: [id number]  DOInjury: 2022   Referring Provider: Javi Walker MD  [address] [place] [address]     Medical Diagnosis:   L ankle pain    Outcome Measure: LEFS 50%    S: Patient reports to therapy and states he still feels difficulty c descending stairs d/t limited motion. O:   Time 4109-6998     Visit 8 Repeat outcome measure at mid point and end. Pain 4/10     ROM impaired      Modalities            Manual      Stretch      Plantarflexion/dorsiflexion stretch 3x30s Yoga strap TE   Self L ankle mob half kneel 3x20 with 3s hold With black Xband TE         Manual stretching  10 mins  ` MT               Exercise      Black Xband NR   On BOSU NR   Lunges onto Airex  -Fwd/Lat x30 Single UE support  No shoe NR   Blue TB NR   6 inch box NR   BUE support on rail NR   BUE support on rail NR   6\" NR   Marches on foam 2 mins R & L  c hold during SLS NR   Standing hip abd 2x10 R & L  Foam  NR       Purple band  NR         Wobbe board x30 each direction Flex/STS NR          NR   c head turns  NR         Treadmill walking  8 mins  1.0 MPH NR         Half kneel DF self mob c Red x-band x20 10s holds  TE   Standing step DF self mob c Red x-band x20 10s holds  TE         Step up onto foam  x30 No shoes NR         Step up onto 8\" step c LLE remaining on step - Lunge fwd into DF stretch x30  BTB loop  TE                             A:  Tolerated well. Patient instructed in self mobilizations in order to improve DF d/t stiffness. Added to I HEPs this session. Patient demonstrates good understanding of how to perform. Continue to progress in order to return patient to work without limitations. P: Continue with rehab plan.   Irlanda [place] PTA [id number]    Treatment Charges: Mins Units   Initial Evaluation Re-Evaluation     Ther Exercise         TE 15 1   Manual Therapy     MT 10 1   Ther Activities        TA     Gait Training          GT     Neuro Re-education NR 15 1   Modalities     Non-Billable Service Time     Other     Total Time/Units 40 3

## 2022-05-09 ENCOUNTER — TREATMENT (OUTPATIENT)
Dept: PHYSICAL THERAPY | Age: 32
End: 2022-05-09
Payer: COMMERCIAL

## 2022-05-09 DIAGNOSIS — M25.572 ACUTE LEFT ANKLE PAIN: Primary | ICD-10-CM

## 2022-05-09 PROCEDURE — 97110 THERAPEUTIC EXERCISES: CPT

## 2022-05-09 PROCEDURE — 97530 THERAPEUTIC ACTIVITIES: CPT

## 2022-05-09 PROCEDURE — 97112 NEUROMUSCULAR REEDUCATION: CPT

## 2022-05-09 NOTE — PROGRESS NOTES
[address] [facility] [address] [facility]   Phone: [phone]   Fax: [phone]      Physical Therapy Treatment Note    Date: 2022  Patient Name: Kristin Gibbs  : 1990   MRN: [id number]  DOInjury: 2022   Referring Provider: Marvetta Boas, MD  [address] [place] [address]     Medical Diagnosis: L ankle pain  Outcome Measure: LEFS 50%    S: Patient reports he feels that he is continuing to improve c PT. Reports decrease in stiffness. O:   Time 140-220     Visit 9 Repeat outcome measure at mid point and end. Pain 4/10     ROM impaired      Modalities            Manual            Stretch      Yoga strap TE   With black Xband TE   ` MT               Exercise      Black Xband NR   On BOSU NR   NR   Blue TB NR   6 inch box NR   BUE support on rail NR   BUE support on rail NR   6\" NR   Marches on foam 2 mins R & L  c hold during SLS NR   Standing hip abd 2x10 R & L  Foam  NR       Resisted walking 10x each Purple band  TA   Wobbe board x30 each direction Flex/STS NR          NR   c head turns  NR         Treadmill walking  8 mins  1.0 MPH NR          TE    TE         Step up onto foam into SLS  x30 No shoes NR         Step up onto 8\" step c LLE remaining on step - Lunge fwd into DF stretch x30  BTB loop  TE         Step-ups   Fwd/lat x30 8\" TA           A:  Tolerated well. Continued to progress patient c increased proprioceptive as well as LE stability. Moderate fatigue post treatment session. P: Continue with rehab plan.   Irlanda Tolbert [facility] [id number]    Treatment Charges: Mins Units   Initial Evaluation     Re-Evaluation     Ther Exercise         TE 10 1   Manual Therapy     MT     Ther Activities        TA 15 1   Gait Training          GT     Neuro Re-education NR 15 1   Modalities     Non-Billable Service Time     Other     Total Time/Units 40 3

## 2022-05-10 ENCOUNTER — TREATMENT (OUTPATIENT)
Dept: PHYSICAL THERAPY | Age: 32
End: 2022-05-10
Payer: COMMERCIAL

## 2022-05-10 DIAGNOSIS — M25.572 ACUTE LEFT ANKLE PAIN: Primary | ICD-10-CM

## 2022-05-10 PROCEDURE — 97110 THERAPEUTIC EXERCISES: CPT

## 2022-05-10 PROCEDURE — 97530 THERAPEUTIC ACTIVITIES: CPT

## 2022-05-10 PROCEDURE — 97112 NEUROMUSCULAR REEDUCATION: CPT

## 2022-05-10 NOTE — PROGRESS NOTES
[address] [facility] [address] [facility]   Phone: [phone]   Fax: [phone]      Physical Therapy Treatment Note    Date: 5/10/2022  Patient Name: Kenya Burgess  : 1990   MRN: [id number]  DOInjury: 2022   Referring Provider: Denise Cabrera MD  [address] [place] [address]     Medical Diagnosis: L ankle pain  Outcome Measure: LEFS 50%    S: Patient reports his L ankle is getting slowly better and he is able to ambulate with less of a limp, did report turning quickly while carrying a box and feeling a sharp pain but no residual pain since. O:   Time 8550-9640     Visit 10 Repeat outcome measure at mid point and end. Pain 4/10     ROM impaired      Modalities            Manual            Stretch      Yoga strap TE   With black Xband TE   ` MT               Exercise      On BOSU NR   NR   BUE support on rail NR   BUE support on rail NR   6\" NR   Marches on foam 2 mins R & L  c hold during SLS NR   Standing hip abd 2x10 R & L  Foam  NR              NR   c head turns  NR         Treadmill walking  6 mins  1.4 MPH TA          TE    TE   Rocker board HC stretch 10x10s hold  TE   Step up onto foam into SLS  x30 No shoes NR         Step up onto 8\" step c LLE remaining on step - Lunge fwd into DF stretch x30  BTB loop  TE         Step-ups   Fwd/lat x30 8\" TA         BOSU squats x15  NR   BOSU weight shifts x15 frontal, coronal, around the world  NR           A:  Tolerated well. Progressed ROM and neuromuscular control, pt reported soreness but no significant increase in pain. P: Continue with rehab plan. Neha Burns.  Silvano Mcconnell, PT  License number:  [id number]    Treatment Charges: Mins Units   Initial Evaluation     Re-Evaluation     Ther Exercise         TE 10 1   Manual Therapy     MT     Ther Activities        TA 10 1   Gait Training          GT     Neuro Re-education NR 20 1   Modalities     Non-Billable Service Time     Other     Total Time/Units 40 3

## 2022-05-12 ENCOUNTER — TREATMENT (OUTPATIENT)
Dept: PHYSICAL THERAPY | Age: 32
End: 2022-05-12
Payer: COMMERCIAL

## 2022-05-12 DIAGNOSIS — M25.572 ACUTE LEFT ANKLE PAIN: Primary | ICD-10-CM

## 2022-05-12 PROCEDURE — 97112 NEUROMUSCULAR REEDUCATION: CPT | Performed by: PHYSICAL THERAPIST

## 2022-05-12 PROCEDURE — 97530 THERAPEUTIC ACTIVITIES: CPT | Performed by: PHYSICAL THERAPIST

## 2022-05-12 PROCEDURE — 97110 THERAPEUTIC EXERCISES: CPT | Performed by: PHYSICAL THERAPIST

## 2022-05-12 NOTE — PROGRESS NOTES
[address] [facility] [address] [facility]   Phone: [phone]   Fax: [phone]      Physical Therapy Treatment Note    Date: 2022  Patient Name: Daniel Burns  : 1990   MRN: [id number]  DOInjury: 2022   Referring Provider: Pantera Hendrickson MD  [address] [place] [address]     Medical Diagnosis: L ankle pain  Outcome Measure: LEFS 50%    S: Pt reports he is improving but continues to lack full dorsiflexion limiting him with stairs. O:   Time 8277-9185     Visit 10 Repeat outcome measure at mid point and end. Pain 4/10     ROM impaired      Modalities            Manual            Stretch      Yoga strap TE   With black Xband TE   ` MT               Exercise      On BOSU NR   NR   BUE support on rail NR   BUE support on rail NR   6\" NR   Marches on foam 2 mins R & L  c hold during SLS NR   Standing hip abd 2x10 R & L  Foam  NR              NR   c head turns  NR         1.4 MPH TA        TE    TE    TE   No shoes NR       BTB loop  TE       8\" TA        NR    NR    Performed Today     TM amb x10 min  TA   Rocker board s/s, f/b x30 ea slow NR   HC stretch rocker board x20 10s holds  TE   Heel taps 3x10 forward/lateral 4 in NR                             A:  Tolerated well. Pt progressed today c dorsiflexion stretching and amb mechanics. He tolerated the session well c moderate fatigue and no incr in pain. He is most limited c dorsiflexion ROM limiting stairs and squatting activity. Will continue to progress c stability and proprioception as ROM allows. P: Continue with rehab plan.     Rachel Victor PT DPT [id number]    Treatment Charges: Mins Units   Initial Evaluation     Re-Evaluation     Ther Exercise         TE 10 1   Manual Therapy     MT     Ther Activities        TA 10 1   Gait Training          GT     Neuro Re-education NR 20 1   Modalities     Non-Billable Service Time     Other     Total Time/Units 40 3

## 2022-05-16 ENCOUNTER — TREATMENT (OUTPATIENT)
Dept: PHYSICAL THERAPY | Age: 32
End: 2022-05-16
Payer: COMMERCIAL

## 2022-05-16 DIAGNOSIS — M25.572 ACUTE LEFT ANKLE PAIN: Primary | ICD-10-CM

## 2022-05-16 PROCEDURE — 97112 NEUROMUSCULAR REEDUCATION: CPT | Performed by: PHYSICAL THERAPIST

## 2022-05-16 PROCEDURE — 97530 THERAPEUTIC ACTIVITIES: CPT | Performed by: PHYSICAL THERAPIST

## 2022-05-16 NOTE — PROGRESS NOTES
[address] [facility] [address] [facility]   Phone: [phone]   Fax: [phone]      Physical Therapy Treatment Note    Date: 2022  Patient Name: Isidra Diop  : 1990   MRN: [id number]  DOInjury: 2022   Referring Provider: Brittany Anna MD  [address] [place] [address]     Medical Diagnosis: L ankle pain  Outcome Measure: LEFS 50%    S: Pt reports continued improvement c amb but continues to have difficulty c stairs. O:   Time 140-220     Visit 11 Repeat outcome measure at mid point and end. Pain 4/10     ROM impaired      Modalities            Manual            Stretch      Yoga strap TE   With black Xband TE   ` MT               Exercise      On BOSU NR   NR   BUE support on rail NR   BUE support on rail NR   6\" NR   Marches on foam 2 mins R & L  c hold during SLS NR   Standing hip abd 2x10 R & L  Foam  NR              NR   c head turns  NR         1.4 MPH TA        TE    TE    TE   No shoes NR       BTB loop  TE       8\" TA        NR    NR    Performed Today     TM amb x10 min  TA   Rocker board s/s, f/b x30 ea slow NR   HC stretch rocker board x20 10s holds  TE   Resisted amb x10 ea 4 direction NR   Step ups into SLS  x30 ea Forward, lateral NR                       A:  Tolerated well. Pt progressed c proprioceptive activity today to improve stability and allow full return to work. He is progressing, although this is hindered by dorsiflexion ROM. P: Continue with rehab plan.     Waldemar Caceres PT DPT [id number]    Treatment Charges: Mins Units   Initial Evaluation     Re-Evaluation     Ther Exercise         TE 3 0   Manual Therapy     MT     Ther Activities        TA 10 1   Gait Training          GT     Neuro Re-education NR 27 2   Modalities     Non-Billable Service Time     Other     Total Time/Units 40 3

## 2022-05-17 ENCOUNTER — TREATMENT (OUTPATIENT)
Dept: PHYSICAL THERAPY | Age: 32
End: 2022-05-17
Payer: COMMERCIAL

## 2022-05-17 DIAGNOSIS — M25.572 ACUTE LEFT ANKLE PAIN: Primary | ICD-10-CM

## 2022-05-17 PROCEDURE — 97112 NEUROMUSCULAR REEDUCATION: CPT

## 2022-05-17 PROCEDURE — 97530 THERAPEUTIC ACTIVITIES: CPT

## 2022-05-17 NOTE — PROGRESS NOTES
[address] [facility] [address] [facility]   Phone: [phone]   Fax: [phone]      Physical Therapy Treatment Note    Date: 2022  Patient Name: Kenya Burgess  : 1990   MRN: [id number]  DOInjury: 2022   Referring Provider: Denise Cabrera MD  [address] [place] [address]     Medical Diagnosis: L ankle pain  Outcome Measure: LEFS 50%     S: Pt reports continued improvement c amb but continues to have difficulty c stairs. O:   Time 1240-130     Visit 12 Repeat outcome measure at mid point and end. Pain 4/10     ROM impaired      Modalities            Manual            Stretch      Yoga strap TE   With black Xband TE   ` MT               Exercise      On BOSU NR   NR   BUE support on rail NR   BUE support on rail NR   6\" NR   Marches on foam 2 mins R & L  c hold during SLS NR   Standing hip abd 2x10 R & L  Foam  NR              NR   c head turns  NR         1.4 MPH TA        TE    TE    TE   No shoes NR       BTB loop  TE       8\" TA        NR    NR    Performed Today           TM amb x10 min  TA   Rocker board s/s, f/b x30 ea slow NR   HC stretch rocker board x20 10s holds  TE   Resisted amb x10 ea 4 direction NR   Step ups into SLS  x30 ea Forward, lateral NR   Ecc heel taps  x20 4\"  Fwd/lat/ext  NR           A:  Tolerated well. Continued to progress patient c proprioceptive activity today to improve stability and allow full return to work. Patient has f/u apt scheduled c his surgeon 22. Will continue to work on improving DF mobility in order to return to work without restriction. P: Continue with rehab plan.   Irlanda Tolbert PTA [id number]    Treatment Charges: Mins Units   Initial Evaluation     Re-Evaluation     Ther Exercise         TE 3 0   Manual Therapy     MT     Ther Activities        TA 10 1   Gait Training          GT     Neuro Re-education NR 37 2   Modalities     Non-Billable Service Time     Other     Total Time/Units 50 3

## 2022-05-18 ENCOUNTER — OFFICE VISIT (OUTPATIENT)
Dept: ORTHOPEDIC SURGERY | Age: 32
End: 2022-05-18
Payer: COMMERCIAL

## 2022-05-18 VITALS — HEIGHT: 72 IN | WEIGHT: 195 LBS | BODY MASS INDEX: 26.41 KG/M2

## 2022-05-18 DIAGNOSIS — Z98.890 HISTORY OF ANKLE SURGERY: Primary | ICD-10-CM

## 2022-05-18 DIAGNOSIS — S82.842D CLOSED BIMALLEOLAR FRACTURE OF LEFT ANKLE WITH ROUTINE HEALING, SUBSEQUENT ENCOUNTER: ICD-10-CM

## 2022-05-18 PROCEDURE — 99213 OFFICE O/P EST LOW 20 MIN: CPT | Performed by: ORTHOPAEDIC SURGERY

## 2022-05-18 NOTE — PROGRESS NOTES
Chief Complaint:   Chief Complaint   Patient presents with    Ankle Injury     WC FU Left ankle injury 1/20/2022. ORIF left ankle fracture 1/28/2022. Continues with PTx. Concerns regarding ROM. Tonya Mcduffie is about 3-1/2 months after ORIF of his left lateral malleolus fracture, major issue is difficulty with stiffness on dorsiflexion limiting his activities including especially descending from a stair. 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 Allergies; medications; past medical, surgical, family, and social history; and problem list have been reviewed today and updated as indicated in this encounter seen below. Exam: Left ankle shows some mild nonspecific soft tissue swelling no erythema, wound is healed. Ankle is well aligned and stable although limited dorsiflexion to about 10 degrees. Radiographs: Three-view x-rays of left ankle today show ankle mortise to be anatomic, distal tib-fib syndesmosis is appropriate, lateral plate fixation intact without loosening or malposition. Shaina Schmitz was seen today for ankle injury. Diagnoses and all orders for this visit:    History of ankle surgery  -     XR ANKLE LEFT (MIN 3 VIEWS)    Closed bimalleolar fracture of left ankle with routine healing, subsequent encounter  -     XR ANKLE LEFT (MIN 3 VIEWS)       He is making steady progress, he is okay with activities at home but I do not think he is ready to resume the physical demands of his work. I did enumerate some light duty restrictions if such might be available at work otherwise stay off work until estimated July 1, follow-up here in 3 months for clinical and x-ray exams of the left ankle. Return in about 3 months (around 8/18/2022).      Current Outpatient Medications   Medication Sig Dispense Refill    ibuprofen (ADVIL;MOTRIN) 200 MG tablet Take 400 mg by mouth every 6 hours as needed for Pain      acetaminophen (TYLENOL) 500 MG tablet Take 500 mg by mouth every 6 hours as needed for Pain       No current facility-administered medications for this visit. Patient Active Problem List   Diagnosis    Abdominal pain, rule out appendicitis     Colitis    Lower abdominal pain    Closed bimalleolar fracture of left ankle       Past Medical History:   Diagnosis Date    Ankle fracture, left     For OR 1/28/22    COVID 12/28/2021       Past Surgical History:   Procedure Laterality Date    ANKLE FRACTURE SURGERY Left 1/28/2022    LEFT ANKLE OPEN REDUCTION INTERNAL FIXATION BIMALLEOLAR ANKLE FRACTURE performed by Vetnura Barcenas MD at [facility] OR       No Known Allergies    Social History     Socioeconomic History    Marital status: Single     Spouse name: None    Number of children: None    Years of education: None    Highest education level: None   Occupational History    None   Tobacco Use    Smoking status: Former Smoker     Quit date: 2012     Years since quitting: 10.3    Smokeless tobacco: Current User     Types: Chew   Vaping Use    Vaping Use: Never used   Substance and Sexual Activity    Alcohol use: No     Comment: socially    Drug use: No    Sexual activity: None   Other Topics Concern    None   Social History Narrative    None     Social Determinants of Health     Financial Resource Strain:     Difficulty of Paying Living Expenses: Not on file   Food Insecurity:     Worried About Running Out of Food in the Last Year: Not on file    Silverio of Food in the Last Year: Not on file   Transportation Needs:     Lack of Transportation (Medical): Not on file    Lack of Transportation (Non-Medical):  Not on file   Physical Activity:     Days of Exercise per Week: Not on file    Minutes of Exercise per Session: Not on file   Stress:     Feeling of Stress : Not on file   Social

## 2022-05-19 ENCOUNTER — TREATMENT (OUTPATIENT)
Dept: PHYSICAL THERAPY | Age: 32
End: 2022-05-19
Payer: COMMERCIAL

## 2022-05-19 DIAGNOSIS — M25.572 ACUTE LEFT ANKLE PAIN: Primary | ICD-10-CM

## 2022-05-19 PROCEDURE — 97530 THERAPEUTIC ACTIVITIES: CPT | Performed by: PHYSICAL THERAPIST

## 2022-05-19 PROCEDURE — 97112 NEUROMUSCULAR REEDUCATION: CPT | Performed by: PHYSICAL THERAPIST

## 2022-05-19 NOTE — PROGRESS NOTES
[address] [facility] [address] [facility]   Phone: [phone]   Fax: [phone]      Physical Therapy Treatment Note    Date: 2022  Patient Name: Kayla Craven  : 1990   MRN: [id number]  DOInjury: 2022   Referring Provider: Saray Zabala MD  [address] [place] [address]     Medical Diagnosis: L ankle pain  Outcome Measure: LEFS 50%     S: pt reports that he is weaning from brace and only using it when walking consistently on uneven surfaces. He had good follow up c surgeon. O:   Time [id number]     Visit 13 Repeat outcome measure at mid point and end. Pain 4/10     ROM impaired      Modalities            Manual            Stretch      Yoga strap TE   With black Xband TE   ` MT               Exercise      On BOSU NR   NR   BUE support on rail NR   BUE support on rail NR   6\" NR   Marches on foam 2 mins R & L  c hold during SLS NR   Standing hip abd 2x10 R & L  Foam  NR              NR   c head turns  NR         1.4 MPH TA        TE    TE    TE   No shoes NR       BTB loop  TE       8\" TA        NR    NR    Performed Today           TM amb x12 min  TA   Rocker board s/s, f/b x30 ea slow NR   Side step 3 laps Band around toes NR   Resisted amb x10 ea 4 direction PXB NR   Step ups into SLS  x30 ea Forward, lateral 2 foam NR   Step ups c resisted amb x10 forward/lateral Rxb, foam NR           A:  Tolerated well. Pt was given good report from surgeon and was cleared to begin weaning from his brace. He is no longer wearing brace around the house, but is only utilizing it when on uneven surfaces or prolonged activity. He was progressed today c functional stability and proprioceptive activity to reduce limitation c ADL and work activity. Potential RTW date is 2022    P: Continue with rehab plan.   Anette Montero PT DPT [id number]    Treatment Charges: Mins Units   Initial Evaluation     Re-Evaluation     Ther Exercise         TE     Manual Therapy     MT     Ther Activities TA 12 1   Gait Training          GT     Neuro Re-education NR 28 2   Modalities     Non-Billable Service Time     Other     Total Time/Units 40 3

## 2022-05-23 ENCOUNTER — TREATMENT (OUTPATIENT)
Dept: PHYSICAL THERAPY | Age: 32
End: 2022-05-23
Payer: COMMERCIAL

## 2022-05-23 DIAGNOSIS — M25.572 ACUTE LEFT ANKLE PAIN: Primary | ICD-10-CM

## 2022-05-23 PROCEDURE — 97112 NEUROMUSCULAR REEDUCATION: CPT | Performed by: PHYSICAL THERAPIST

## 2022-05-23 PROCEDURE — 97530 THERAPEUTIC ACTIVITIES: CPT | Performed by: PHYSICAL THERAPIST

## 2022-05-23 NOTE — PROGRESS NOTES
[address] [facility]   Phone: [phone]   Fax: [phone]      Physical Therapy Treatment Note    Date: 2022  Patient Name: Daniel Burns  : 1990   MRN: [id number]  DOInjury: 2022   Referring Provider: Pantera Hendrickson MD  [address] [place] [address]     Medical Diagnosis: L ankle pain  Outcome Measure: LEFS 50%     S: Pt reports he is progressing well and is weaning well from brace. O:   Time 145-225     Visit 14 Repeat outcome measure at mid point and end. Pain 4/10     ROM impaired      Modalities            Manual            Stretch      Yoga strap TE   With black Xband TE   ` MT               Exercise      On BOSU NR   NR   BUE support on rail NR   BUE support on rail NR   6\" NR   Marches on foam 2 mins R & L  c hold during SLS NR   Standing hip abd 2x10 R & L  Foam  NR              NR   c head turns  NR         1.4 MPH TA        TE    TE    TE   No shoes NR       BTB loop  TE       8\" TA        NR    NR    Performed Today           TM amb x12 min  TA   Rocker board s/s, f/b x30 ea slow NR   Side step 3 laps Band around toes NR   Resisted amb x10 ea 4 direction PXB NR   Marching bosu x3 min  NR   Lunges onto bosu x20 B  TA           A:  Tolerated well. Pt progressed today c proprioceptive activity to improve ankle stability needed for return to work. He tolerated the session well c moderate fatigue and no incr in pain. He displayed some balance limitation c progression to bosu ball march today. P: Continue with rehab plan.   Ligia Aponte PT DPT [id number]    Treatment Charges: Mins Units   Initial Evaluation     Re-Evaluation     Ther Exercise         TE     Manual Therapy     MT     Ther Activities        TA 10 1   Gait Training          GT     Neuro Re-education NR 30 2   Modalities     Non-Billable Service Time     Other     Total Time/Units 40 3

## 2022-05-25 ENCOUNTER — TREATMENT (OUTPATIENT)
Dept: PHYSICAL THERAPY | Age: 32
End: 2022-05-25
Payer: COMMERCIAL

## 2022-05-25 DIAGNOSIS — M25.572 ACUTE LEFT ANKLE PAIN: Primary | ICD-10-CM

## 2022-05-25 PROCEDURE — 97112 NEUROMUSCULAR REEDUCATION: CPT | Performed by: PHYSICAL THERAPIST

## 2022-05-25 PROCEDURE — 97140 MANUAL THERAPY 1/> REGIONS: CPT | Performed by: PHYSICAL THERAPIST

## 2022-05-25 NOTE — PROGRESS NOTES
[address] [facility]   Phone: [phone]   Fax: [phone]      Physical Therapy Treatment Note    Date: 2022  Patient Name: Shayan Tilley  : 1990   MRN: [id number]  DOInjury: 2022   Referring Provider: Verito Zaidi MD  [address] [place] [address]     Medical Diagnosis: L ankle pain  Outcome Measure: LEFS 50%     S: The pt reports difficulty negotiating downs stairs & must ER hip to negotiate down stairs. Also, pt c/o difficulty squatting due to limited left ankle dorsiflex. O:   Time 2071-3779     Visit 15 Repeat outcome measure at mid point and end. Pain 4/10     ROM impaired      Modalities            Manual            Stretch      Yoga strap TE   With black Xband TE   ` MT               Exercise      On BOSU NR   NR   BUE support on rail NR   BUE support on rail NR   6\" NR   Marches on foam 2 mins R & L  c hold during SLS NR   Standing hip abd 2x10 R & L  Foam  NR              NR   c head turns  NR         1.4 MPH TA        TE    TE    TE   No shoes NR       BTB loop  TE       8\" TA       Foam heel raises & marching x30 each  NR   Foam static standing  x2min eyes open  x3min eyes closed  NR   bosu ball squats x30  NR   Manual Therapy Christopher Williamson LLE talus, calcaneus, tarsals, meta-tarsals 2022 MT   bosu ball flat side standing & weight shifts, & circles Standing 3min  Weight shifts & circles 30x all directions  NR   bosu ball marching high knee x30  NR   bosu ball heel raises x30  NR   bosu ball step-ups forward x30  NR   bosu ball step-ups lateral R & L x30 each  NR   brianne-disc standing 3min eyes open    NR   brianne-disc squats x30  NR     A:  Tolerated well. The pt progressed with today's Tx session to perform new brianne-disc, bosu ball, & foam balance pad. P: Continue with rehab plan & progress to include new coordination & proprio[place] [id number]    Treatment Charges: Mins Units   Initial Evaluation     Re-Evaluation     Ther Exercise         TE 5 NB   Manual Therapy     MT 10 1   Ther Activities        TA     Gait Training          GT     Neuro Re-education NR 30 2   Modalities     Non-Billable Service Time     Other     Total Time/Units 40 3

## 2022-05-26 ENCOUNTER — TREATMENT (OUTPATIENT)
Dept: PHYSICAL THERAPY | Age: 32
End: 2022-05-26
Payer: COMMERCIAL

## 2022-05-26 DIAGNOSIS — M25.572 ACUTE LEFT ANKLE PAIN: Primary | ICD-10-CM

## 2022-05-26 PROCEDURE — 97530 THERAPEUTIC ACTIVITIES: CPT | Performed by: PHYSICAL THERAPIST

## 2022-05-26 PROCEDURE — 97112 NEUROMUSCULAR REEDUCATION: CPT | Performed by: PHYSICAL THERAPIST

## 2022-05-26 NOTE — PROGRESS NOTES
[address] [facility] [address] [facility]   Phone: [phone]   Fax: [phone]      Physical Therapy Treatment Note    Date: 2022  Patient Name: Spenser Hicks  : 1990   MRN: [id number]  DOInjury: 2022   Referring Provider: Ventura Barcenas MD  [address] [place] [address]     Medical Diagnosis: L ankle pain  Outcome Measure: LEFS 50%     S: The pt that he is improving but still notes some instability. O:   Time [id number]     Visit 16 Repeat outcome measure at mid point and end. Pain 4/10     ROM impaired      Modalities            Manual            Stretch      Yoga strap TE   With black Xband TE   ` MT               Exercise      On BOSU NR   NR   BUE support on rail NR   BUE support on rail NR   6\" NR   Marches on foam 2 mins R & L  c hold during SLS NR   Standing hip abd 2x10 R & L  Foam  NR              NR   c head turns  NR         1.4 MPH TA        TE    TE    TE   No shoes NR       BTB loop  TE       8\" TA       Foam heel raises & marching x30 each  NR   Foam static standing  x2min eyes open  x3min eyes closed  NR   bosu ball squats x30  NR   Manual Therapy Christopher Mobs LLE talus, calcaneus, tarsals, meta-tarsals 2022 MT   bosu ball flat side standing & weight shifts, & circles Standing 3min  Weight shifts & circles 30x all directions  NR   bosu ball marching high knee x30  NR   bosu ball heel raises x30  NR   bosu ball step-ups forward x30  NR   bosu ball step-ups lateral R & L x30 each  NR   brianne-disc standing 3min eyes open    NR    Performed Today     Resisted amb x15 ea  NR   Steam boats x30 B YTB NR   Step ups bosu x30 forward B  NR   TM amb x10 min  TA                 A:  Tolerated well. Pt progressed today c functional mobility and proprioceptive activity to improve stability needed for RTW. He tolerated the session well c moderate fatigue and no incr in pain.      P: Continue with rehab plan & progress to include new coordination & proprioception training exercises.      Sayda Godinez PT DPT [id number]    Treatment Charges: Mins Units   Initial Evaluation     Re-Evaluation     Ther Exercise         TE     Manual Therapy     MT     Ther Activities        TA 10 1   Gait Training          GT     Neuro Re-education NR 30 2   Modalities     Non-Billable Service Time     Other     Total Time/Units 40 3

## 2022-05-31 ENCOUNTER — TREATMENT (OUTPATIENT)
Dept: PHYSICAL THERAPY | Age: 32
End: 2022-05-31
Payer: COMMERCIAL

## 2022-05-31 DIAGNOSIS — M25.572 ACUTE LEFT ANKLE PAIN: Primary | ICD-10-CM

## 2022-05-31 PROCEDURE — 97530 THERAPEUTIC ACTIVITIES: CPT | Performed by: PHYSICAL THERAPIST

## 2022-05-31 PROCEDURE — 97112 NEUROMUSCULAR REEDUCATION: CPT | Performed by: PHYSICAL THERAPIST

## 2022-05-31 PROCEDURE — 97140 MANUAL THERAPY 1/> REGIONS: CPT | Performed by: PHYSICAL THERAPIST

## 2022-05-31 NOTE — PROGRESS NOTES
[address] [facility] [address] [facility]   Phone: [phone]   Fax: [phone]      Physical Therapy Treatment Note    Date: 2022  Patient Name: Karon Ward  : 1990   MRN: [id number]  DOInjury: 2022   Referring Provider: Fernie Shrestha MD  [address] [place] [address]     Medical Diagnosis: L ankle pain  Outcome Measure: LEFS 50%     S: The pt that he is improving but still notes some instability. O:   Time [id number]     Visit 16 Repeat outcome measure at mid point and end. Pain 4/10     ROM impaired      Modalities            Manual            Stretch      Yoga strap TE   With black Xband TE   ` MT               Exercise      On BOSU NR   NR   BUE support on rail NR   BUE support on rail NR   6\" NR   Marches on foam 2 mins R & L  c hold during SLS NR   Standing hip abd 2x10 R & L  Foam  NR              NR   c head turns  NR         1.4 MPH TA        TE    TE    TE   No shoes NR       BTB loop  TE       8\" TA       Foam heel raises & marching x30 each  NR   Foam static standing  x2min eyes open  x3min eyes closed  NR   bosu ball squats x30  NR   Manual Therapy [place] Mobs LLE talus, calcaneus, tarsals, meta-tarsals 2022 MT   bosu ball flat side standing & weight shifts, & circles Standing 3min  Weight shifts & circles 30x all directions  NR   bosu ball marching high knee x30  NR   bosu ball heel raises x30  NR   bosu ball step-ups forward x30  NR   bosu ball step-ups lateral R & L x30 each  NR   brianne-disc standing 3min eyes open    NR    Performed Today     Resisted amb x15 ea  NR   Mobs PA, AP c gastroc stretch x10 min  MT   Squats c AP mob x30   NR   TM amb x10 min  TA   bosu march x3 min  NR           A:  Tolerated well. Pt tx today c mobilizations to reduce restriction c dorsiflexion and MWM to improve joint mechanics. He tolerated the session well c moderate fatigue and no incr in pain. He noted relief in stiffness and pain post session.     P: Continue with rehab plan & progress to include new coordination & proprioception training exercises.      Sayda Godinez PT DPT [id number]    Treatment Charges: Mins Units   Initial Evaluation     Re-Evaluation     Ther Exercise         TE     Manual Therapy     MT 10 1   Ther Activities        TA 10 1   Gait Training          GT     Neuro Re-education NR 20 1   Modalities     Non-Billable Service Time     Other     Total Time/Units 40 3

## 2022-06-07 ENCOUNTER — TREATMENT (OUTPATIENT)
Dept: PHYSICAL THERAPY | Age: 32
End: 2022-06-07
Payer: COMMERCIAL

## 2022-06-07 DIAGNOSIS — M25.572 ACUTE LEFT ANKLE PAIN: Primary | ICD-10-CM

## 2022-06-07 PROCEDURE — 97140 MANUAL THERAPY 1/> REGIONS: CPT | Performed by: PHYSICAL THERAPIST

## 2022-06-07 PROCEDURE — 97530 THERAPEUTIC ACTIVITIES: CPT | Performed by: PHYSICAL THERAPIST

## 2022-06-07 PROCEDURE — 97112 NEUROMUSCULAR REEDUCATION: CPT | Performed by: PHYSICAL THERAPIST

## 2022-06-08 NOTE — PROGRESS NOTES
[address] [facility]   Phone: [phone]   Fax: [phone]      Physical Therapy Treatment Note    Date: 2022  Patient Name: Georgina Lauren  : 1990   MRN: [id number]  DOInjury: 2022   Referring Provider: Ellen Johnson MD  [address] [place] [address]     Medical Diagnosis: L ankle pain  Outcome Measure: LEFS 50%     S: The pt continues to c/o difficulty negotiating down stairs, & difficulty squatting due to limited left ankle dorsiflex. O:   Time 0167-9260     Visit 17 Repeat outcome measure at mid point and end. Pain 4/10     ROM impaired      Modalities            Manual            Stretch      Yoga strap TE   With black Xband TE   ` MT               Exercise      On BOSU NR   NR   BUE support on rail NR   BUE support on rail NR   6\" NR              NR   c head turns  NR         1.4 MPH TA        TE    TE    TE   No shoes NR       BTB loop  TE       8\" TA       Manual Therapy Wise Mobs LLE talus, calcaneus, tarsals, meta-tarsals 2022 MT   X-Band squats 4-way forward, backward, & R & L Lateral x30 each purple NR   X-Band step-ups forward leading up & finishing down with LLE x30 Purple  6'' NR   X-band step-ups R & L lateral x30 each Purple  6'' NR   Petty-Disc Squats x30 Each foot on separate disc NR   Petty-Disc lunges forward x30 L  NR   Petty-Disc lunges lateral x30 L  NR   X-band AMB forward, lateral, & backward x30 each purple TA     A:  Tolerated well. The pt progressed with today's Tx session to perform x-band galileo-ups & squats, & new petty-disc exercises. P: Continue with rehab plan & progress to include new petty-disc, foam balance, & bosu ball exercises.      Chantal Gonzalez PT DPT [id number]    Treatment Charges: Mins Units   Initial Evaluation     Re-Evaluation     Ther Exercise         TE     Manual Therapy     MT 10 1   Ther Activities        TA 15 1   Gait Training          GT     Neuro Re-education NR 30 2   Modalities Non-Billable Service Time     Other     Total Time/Units 55 4

## 2022-06-13 ENCOUNTER — TREATMENT (OUTPATIENT)
Dept: PHYSICAL THERAPY | Age: 32
End: 2022-06-13
Payer: COMMERCIAL

## 2022-06-13 DIAGNOSIS — M25.572 ACUTE LEFT ANKLE PAIN: Primary | ICD-10-CM

## 2022-06-13 PROCEDURE — 97112 NEUROMUSCULAR REEDUCATION: CPT | Performed by: PHYSICAL THERAPIST

## 2022-06-13 PROCEDURE — 97530 THERAPEUTIC ACTIVITIES: CPT | Performed by: PHYSICAL THERAPIST

## 2022-06-13 NOTE — PROGRESS NOTES
[address] [facility] [address] [facility]   Phone: [phone]   Fax: [phone]      Physical Therapy Treatment Note    Date: 2022  Patient Name: Elizabet Carmen  : 1990   MRN: [id number]  DOInjury: 2022   Referring Provider: Grisel Fitzgerald MD  [address] [place] [address]     Medical Diagnosis: L ankle pain  Outcome Measure: LEFS 50%     S: The pt continues to report limitation c dorsiflexion but ambulation is improving. O:   Time 0221-3100     Visit 18 Repeat outcome measure at mid point and end. Pain 4/10     ROM impaired      Modalities            Manual            Stretch      Yoga strap TE   With black Xband TE   ` MT               Exercise      On BOSU NR   NR   BUE support on rail NR   BUE support on rail NR   6\" NR              NR   c head turns  NR         1.4 MPH TA        TE    TE    TE   No shoes NR       BTB loop  TE       8\" TA       Manual Therapy Dragoon Mobs LLE talus, calcaneus, tarsals, meta-tarsals 2022 MT   X-Band squats 4-way forward, backward, & R & L Lateral x30 each purple NR   X-Band step-ups forward leading up & finishing down with LLE x30 Purple  6'' NR   X-band step-ups R & L lateral x30 each Purple  6'' NR   Petty-Disc Squats x30 Each foot on separate disc NR   Petty-Disc lunges forward x30 L  NR   Petty-Disc lunges lateral x30 L  NR    Performed Today     Rocker board x30 f/b, s/s  NR   bosu marching x3 min  NR   Bosu lunges x30 B  NR   TM amb x10 min  TA                           X-band AMB forward, lateral, & backward x30 each purple TA     A:  Tolerated well. The pt is progressing c proprioception but continues to be restricted c functional mobility secondary to dorsiflexion restriction. He was frustrated c lack of motion needed for lunges today and was educated that it is improving, but he may not obtain full AROM secondary to surgical intervention.     P: Continue with rehab plan & progress to include new petty-disc, foam balance, & bosu ball exercises.      Siabelle Dennis PT DPT [id number]    Treatment Charges: Mins Units   Initial Evaluation     Re-Evaluation     Ther Exercise         TE     Manual Therapy     MT     Ther Activities        TA 15 1   Gait Training          GT     Neuro Re-education NR 25 2   Modalities     Non-Billable Service Time     Other     Total Time/Units 40 3

## 2022-06-14 ENCOUNTER — TREATMENT (OUTPATIENT)
Dept: PHYSICAL THERAPY | Age: 32
End: 2022-06-14
Payer: COMMERCIAL

## 2022-06-14 DIAGNOSIS — M25.572 ACUTE LEFT ANKLE PAIN: Primary | ICD-10-CM

## 2022-06-14 PROCEDURE — 97530 THERAPEUTIC ACTIVITIES: CPT | Performed by: PHYSICAL THERAPIST

## 2022-06-14 PROCEDURE — 97112 NEUROMUSCULAR REEDUCATION: CPT | Performed by: PHYSICAL THERAPIST

## 2022-06-14 NOTE — PROGRESS NOTES
[address] [facility] [address] [facility]   Phone: [phone]   Fax: [phone]      Physical Therapy Treatment Note    Date: 2022  Patient Name: Tala Mckeon  : 1990   MRN: [id number]  DOInjury: 2022   Referring Provider: Angelina Ramires MD  [address] [place] [address]     Medical Diagnosis: L ankle pain  Outcome Measure: LEFS 50%     S: The pt continues to report restriction c dorsiflexion but is improving stability. O:   Time 579-2192     Visit 19 Repeat outcome measure at mid point and end. Pain 4/10     ROM impaired      Modalities            Manual            Stretch      Yoga strap TE   With black Xband TE   ` MT               Exercise      On BOSU NR   NR   BUE support on rail NR   BUE support on rail NR   6\" NR              NR   c head turns  NR         1.4 MPH TA        TE    TE    TE   No shoes NR       BTB loop  TE       8\" TA       Manual Therapy Christopher Mobs LLE talus, calcaneus, tarsals, meta-tarsals 2022 MT   X-Band squats 4-way forward, backward, & R & L Lateral x30 each purple NR   X-Band step-ups forward leading up & finishing down with LLE x30 Purple  6'' NR   X-band step-ups R & L lateral x30 each Purple  6'' NR   Brianne-Disc Squats x30 Each foot on separate disc NR   Brianne-Disc lunges forward x30 L  NR   Brianne-Disc lunges lateral x30 L  NR    Performed Today     TM amb x10 min 4.0 incline, 2.0 speed TA   Ankle mobilizations grade 2-3, dorsiflexion stretch x10 min  MT   Lunges forward/lateral x20 B ea bosu NR   Lateral up/over x30 B bosu NR   Feet together bosu 3x30s holds  NR                             A:  Tolerated well. Ankle mobilizations utilized today to reduce stiffness and improve dorsiflexion motion. He noted relief following manual tx. He progressed c functional stability and proprioception today c moderate fatigue and no pain. P: Continue with rehab plan & progress to include new brianne-disc, foam balance, & bosu ball exercises. Laury Gomes PT DPT [id number]    Treatment Charges: Mins Units   Initial Evaluation     Re-Evaluation     Ther Exercise         TE     Manual Therapy     MT     Ther Activities        TA 10 1   Gait Training          GT     Neuro Re-education NR 30 2   Modalities     Non-Billable Service Time     Other     Total Time/Units 40 3

## 2022-06-16 ENCOUNTER — TREATMENT (OUTPATIENT)
Dept: PHYSICAL THERAPY | Age: 32
End: 2022-06-16
Payer: COMMERCIAL

## 2022-06-16 DIAGNOSIS — M25.572 ACUTE LEFT ANKLE PAIN: Primary | ICD-10-CM

## 2022-06-16 PROCEDURE — 97112 NEUROMUSCULAR REEDUCATION: CPT | Performed by: PHYSICAL THERAPIST

## 2022-06-16 PROCEDURE — 97140 MANUAL THERAPY 1/> REGIONS: CPT | Performed by: PHYSICAL THERAPIST

## 2022-06-16 NOTE — PROGRESS NOTES
[address] [facility] [address] [facility]   Phone: [phone]   Fax: [phone]      Physical Therapy Treatment Note    Date: 2022  Patient Name: Tonya Mcduffie  : 1990   MRN: [id number]  DOInjury: 2022   Referring Provider: Lillie Siddiqi MD  [address] [place] [address]     Medical Diagnosis: L ankle pain  Outcome Measure: LEFS 50%     S: The pt reports significant benefit from manual therapy last session. He feels he is improving overall. O:   Time 955-1040     Visit 20 Repeat outcome measure at mid point and end. Pain 4/10     ROM impaired      Modalities            Manual            Stretch      Yoga strap TE   With black Xband TE   ` MT               Exercise      On BOSU NR   NR   BUE support on rail NR   BUE support on rail NR   6\" NR              NR   c head turns  NR         1.4 MPH TA        TE    TE    TE   No shoes NR       BTB loop  TE       8\" TA       Manual Therapy [place] Mobs LLE talus, calcaneus, tarsals, meta-tarsals 2022 MT   X-Band squats 4-way forward, backward, & R & L Lateral x30 each purple NR   X-Band step-ups forward leading up & finishing down with LLE x30 Purple  6'' NR   X-band step-ups R & L lateral x30 each Purple  6'' NR   Petty-Disc Squats x30 Each foot on separate disc NR   Petty-Disc lunges forward x30 L  NR   Petty-Disc lunges lateral x30 L  NR    Performed Today     TM amb x10 min 4.0 incline, 2.0 speed TA   Ankle mobilizations grade 2-3, dorsiflexion stretch x10 min  MT   Lunges forward/lateral x20 B ea bosu NR   Lateral up/over x30 B bosu NR   Feet together bosu 3x30s holds  NR   Step ups bosu x30 B  NR                       A:  Tolerated well. Ankle stability and manual tx focused on mobility needed for ADL activity. He tolerated the session well c decr in stiffness noted. P: Continue with rehab plan & progress to include new petty-disc, foam balance, & bosu ball exercises.      Lisa Lim PT DPT [id number]    Treatment Charges: Mins Units   Initial Evaluation     Re-Evaluation     Ther Exercise         TE     Manual Therapy     MT 10 1   Ther Activities        TA     Gait Training          GT     Neuro Re-education NR 30 2   Modalities     Non-Billable Service Time     Other     Total Time/Units 40 3

## 2022-06-20 ENCOUNTER — TREATMENT (OUTPATIENT)
Dept: PHYSICAL THERAPY | Age: 32
End: 2022-06-20
Payer: COMMERCIAL

## 2022-06-20 DIAGNOSIS — M25.572 ACUTE LEFT ANKLE PAIN: Primary | ICD-10-CM

## 2022-06-20 PROCEDURE — 97112 NEUROMUSCULAR REEDUCATION: CPT | Performed by: PHYSICAL THERAPIST

## 2022-06-20 PROCEDURE — 97140 MANUAL THERAPY 1/> REGIONS: CPT | Performed by: PHYSICAL THERAPIST

## 2022-06-20 NOTE — PROGRESS NOTES
[facility] [facility]   Phone: [phone]   Fax: [phone]      Physical Therapy Treatment Note    Date: 2022  Patient Name: Stefan Amezcua  : 1990   MRN: [id number]  DOInjury: 2022   Referring Provider: Carey Grimes MD  [address] [place] [address]     Medical Diagnosis: L ankle pain  Outcome Measure: LEFS 50%     S: The pt reports he is able to ambulate now s limitation, but continues to have limitation c stair and squatting d/t dorsiflexion. O:   Time 2242-8819     Visit 21 Repeat outcome measure at mid point and end. Pain 4/10     ROM impaired      Modalities            Manual            Stretch      Yoga strap TE   With black Xband TE   ` MT               Exercise      On BOSU NR   NR   BUE support on rail NR   BUE support on rail NR   6\" NR              NR   c head turns  NR         1.4 MPH TA        TE    TE    TE   No shoes NR       BTB loop  TE       8\" TA       Manual Therapy Christopher Williamson LLE talus, calcaneus, tarsals, meta-tarsals 2022 MT   X-Band squats 4-way forward, backward, & R & L Lateral x30 each purple NR   X-Band step-ups forward leading up & finishing down with LLE x30 Purple  6'' NR   X-band step-ups R & L lateral x30 each Purple  6'' NR   Petty-Disc Squats x30 Each foot on separate disc NR   Petty-Disc lunges forward x30 L  NR   Petty-Disc lunges lateral x30 L  NR    Performed Today     TM amb x10 min 4.0 incline, 2.0 speed TA   Ankle mobilizations grade 2-3, dorsiflexion stretch x10 min  MT   Lunges forward/lateral x20 B ea bosu NR   Heel taps x20 B forward/lateral 4 inch NR   3 point tap x5 B foam NR                             A:  Tolerated well. Pt progressed today c functional stability to improve dynamic stability needed for full return to work. He is displaying significant improvement in ambulation and functional mobility since addition of mobilizations.  Will continue to progress as tolerated to improve stability needed for return to work    P: Continue with rehab plan & progress to include new brianne-disc, foam balance, & bosu ball exercises.      Randalyn Dakin PT DPT [id number]    Treatment Charges: Mins Units   Initial Evaluation     Re-Evaluation     Ther Exercise         TE     Manual Therapy     MT 10 1   Ther Activities        TA     Gait Training          GT     Neuro Re-education NR 35 2   Modalities     Non-Billable Service Time     Other     Total Time/Units 45 3

## 2022-06-21 ENCOUNTER — TREATMENT (OUTPATIENT)
Dept: PHYSICAL THERAPY | Age: 32
End: 2022-06-21
Payer: COMMERCIAL

## 2022-06-21 DIAGNOSIS — M25.572 ACUTE LEFT ANKLE PAIN: Primary | ICD-10-CM

## 2022-06-21 PROCEDURE — 97112 NEUROMUSCULAR REEDUCATION: CPT | Performed by: PHYSICAL THERAPIST

## 2022-06-21 PROCEDURE — 97140 MANUAL THERAPY 1/> REGIONS: CPT | Performed by: PHYSICAL THERAPIST

## 2022-06-21 NOTE — PROGRESS NOTES
[address] [facility]   Phone: [phone]   Fax: [phone]      Physical Therapy Treatment Note    Date: 2022  Patient Name: Timmy Moses  : 1990   MRN: [id number]  DOInjury: 2022   Referring Provider: Candance Lesches, MD  [address] [place] [address]     Medical Diagnosis: L ankle pain  Outcome Measure: LEFS 50%     S: The pt reports considerable progress c manual tx. Rachel Portershweta:   Time 7799-9365     Visit 23 Repeat outcome measure at mid point and end. Pain 4/10     ROM impaired      Modalities            Manual            Stretch      Yoga strap TE   With black Xband TE   ` MT               Exercise      On BOSU NR   NR   BUE support on rail NR   BUE support on rail NR   6\" NR              NR   c head turns  NR         1.4 MPH TA        TE    TE    TE   No shoes NR       BTB loop  TE       8\" TA       Manual Therapy Christopher Mobs LLE talus, calcaneus, tarsals, meta-tarsals 2022 MT   X-Band squats 4-way forward, backward, & R & L Lateral x30 each purple NR   X-Band step-ups forward leading up & finishing down with LLE x30 Purple  6'' NR   X-band step-ups R & L lateral x30 each Purple  6'' NR   Petty-Disc Squats x30 Each foot on separate disc NR   Petty-Disc lunges forward x30 L  NR   Petty-Disc lunges lateral x30 L  NR    Performed Today     TM amb x10 min 4.0 incline, 2.0 speed TA   Ankle mobilizations grade 2-3, dorsiflexion stretch x10 min  MT   Balance board  x30 f/b, s/s  NR   Heel taps x20 B forward/lateral 2 foam NR   3 point tap x5 B foam NR   Squats on 2 foam x20 forward/ lateral B  NR   Step ups into SLS x20 B Forward/lateral NR                 A:  Tolerated well. Pt reports considerable improvement following manual tx. He was progressed post manual c stability exercise to allow full return to work. P: Continue with rehab plan & progress to include new petty-disc, foam balance, & bosu ball exercises.      Bridger Chandler PT DPT [id number]    Treatment Charges: Mins Units   Initial Evaluation     Re-Evaluation     Ther Exercise         TE     Manual Therapy     MT 10 1   Ther Activities        TA     Gait Training          GT     Neuro Re-education NR 45 3   Modalities     Non-Billable Service Time     Other     Total Time/Units 55 4

## 2022-06-27 ENCOUNTER — TREATMENT (OUTPATIENT)
Dept: PHYSICAL THERAPY | Age: 32
End: 2022-06-27
Payer: COMMERCIAL

## 2022-06-27 DIAGNOSIS — M25.572 ACUTE LEFT ANKLE PAIN: Primary | ICD-10-CM

## 2022-06-27 PROCEDURE — 97112 NEUROMUSCULAR REEDUCATION: CPT

## 2022-06-27 PROCEDURE — 97140 MANUAL THERAPY 1/> REGIONS: CPT

## 2022-06-27 NOTE — PROGRESS NOTES
[address] [facility]   Phone: [phone]   Fax: [phone]      Physical Therapy Treatment Note    Date: 2022  Patient Name: Elda Aponte  : 1990   MRN: [id number]  DOInjury: 2022   Referring Provider: Hawa Monroe MD  [address] [place] [address]     Medical Diagnosis: L ankle pain  Outcome Measure: LEFS 50%     S: The patient reports to therapy c no new complaints. O:   Time 8790-6838     Visit 24 Repeat outcome measure at mid point and end. Pain 4/10     ROM impaired      Modalities            Manual            Stretch      Yoga strap TE   With black Xband TE   ` MT               Exercise      On BOSU NR   NR   BUE support on rail NR   BUE support on rail NR   6\" NR              NR   c head turns  NR         1.4 MPH TA        TE    TE    TE   No shoes NR       BTB loop  TE       8\" TA       Manual Therapy Ulmer Mobs LLE talus, calcaneus, tarsals, meta-tarsals 2022 MT   purple NR   Purple  6'' NR   Purple  6'' NR   Each foot on separate disc NR    NR    NR          Performed Today           TM amb x10 min 4.0 incline, 2.0 speed TA   Ankle mobilizations grade 2-3, dorsiflexion stretch  x10 min  MT   Balance board  x30 f/b, s/s  NR   Heel taps x20 B forward/lateral 2 foam NR   foam NR   Squats on 2 foam x20 forward/ lateral B  NR   Forward/lateral NR         Brianne disc squats  x30   NR   Step ecc heel taps  x20  3 directions NR   X-band walking  x10 all directions  Purple  NR         Brianne disc- Single leg balance  10s x 5  NR    Balance board squats  x20 Fwd/lateral NR           A:  Tolerated well. Patient continued to progress c stability/propriceptive activity's in order to return to work without limitations. P: Continue with rehab plan & progress to include new brianne-disc, foam balance, & bosu ball exercises.    Arpita Robertson PTA [id number]    Treatment Charges: Mins Units   Initial Evaluation     Re-Evaluation     Ther Exercise TE     Manual Therapy     MT 8 1   Ther Activities        TA     Gait Training          GT     Neuro Re-education NR 42 2   Modalities     Non-Billable Service Time     Other     Total Time/Units 50 3

## 2022-06-28 ENCOUNTER — TREATMENT (OUTPATIENT)
Dept: PHYSICAL THERAPY | Age: 32
End: 2022-06-28
Payer: COMMERCIAL

## 2022-06-28 DIAGNOSIS — M25.572 ACUTE LEFT ANKLE PAIN: Primary | ICD-10-CM

## 2022-06-28 PROCEDURE — 97112 NEUROMUSCULAR REEDUCATION: CPT

## 2022-06-28 NOTE — PROGRESS NOTES
[address] [facility]   Phone: [phone]   Fax: [phone]      Physical Therapy Treatment Note    Date: 2022  Patient Name: Adam Mccullough  : 1990   MRN: [id number]  DOInjury: 2022   Referring Provider: Judi Aguilar MD  [address] [place] [address]     Medical Diagnosis: L ankle pain  Outcome Measure: LEFS 50%     S:  Patient reports to therapy and states he felt good after yesterday's treatment session. O:   Time 9197-9903     Visit 25 Repeat outcome measure at mid point and end. Pain 4/10     ROM impaired      Modalities            Manual            Stretch      Yoga strap TE   With black Xband TE   ` MT         Exercise      On BOSU NR   NR   BUE support on rail NR   BUE support on rail NR   6\" NR    NR   c head turns  NR   1.4 MPH TA    TE    TE    TE   No shoes NR       BTB loop  TE   8\" TA       Manual Therapy Christopher Mobs LLE talus, calcaneus, tarsals, meta-tarsals 2022 MT   purple NR   Purple  6'' NR   Purple  6'' NR   Each foot on separate disc NR    NR    NR          Performed Today           TM amb x10 min 4.0 incline, 2.0 speed TA   Ankle mobilizations grade 2-3, dorsiflexion stretch  x10 min  MT   Balance board  x30 f/b, s/s  NR   Heel taps x20 B forward/lateral 2 foam NR   foam NR    NR   Forward/lateral NR         Petty disc squats  x30   NR   Step ecc heel taps  x20  3 directions NR   X-band walking  x10 all directions  Purple  NR   Petty disc- Single leg balance  10s x 5  NR    Balance board squats  x20 Fwd/lateral NR         Resisted step-ups onto BOSU  x20 RTB NR   BOSU squats x20  NR           A:  Tolerated well. Patient continued to progress c stability/propriceptive activity's in order to return to work without limitations. P: Continue with rehab plan & progress to include new petty-disc, foam balance, & bosu ball exercises.    Irlanda Tolbert PTA [id number]    Treatment Charges: Mins Units   Initial Evaluation Re-Evaluation     Ther Exercise         TE     Manual Therapy     MT     Ther Activities        TA     Gait Training          GT     Neuro Re-education NR 45 3   Modalities     Non-Billable Service Time     Other     Total Time/Units 45 3

## 2022-06-30 ENCOUNTER — TREATMENT (OUTPATIENT)
Dept: PHYSICAL THERAPY | Age: 32
End: 2022-06-30
Payer: COMMERCIAL

## 2022-06-30 DIAGNOSIS — M25.572 ACUTE LEFT ANKLE PAIN: Primary | ICD-10-CM

## 2022-06-30 PROCEDURE — 97530 THERAPEUTIC ACTIVITIES: CPT | Performed by: PHYSICAL THERAPIST

## 2022-06-30 PROCEDURE — 97110 THERAPEUTIC EXERCISES: CPT | Performed by: PHYSICAL THERAPIST

## 2022-06-30 NOTE — PROGRESS NOTES
[facility] [facility]   Phone: [phone]   Fax: [phone]    Re-evaluation      Date:  2022   Patient: Leonardo Phillip             : 1990                      MRN: [id number]  Referring Provider: Scott Matthews MD  [address] [place] [address]                                 Medical Diagnosis:      L ankle pain      CERTIFICATION PERIOD:  2022 to 2022    ATTENDANCE:  Patient has attended 27 of 27 scheduled treatments from 2022  to 2022. TREATMENTS RECEIVED:  Therapeutic activity, Therapeutic exercise, neuromuscular reeducation, HEP    INITIAL STATUS:  Observations: well nourished male     Inspection: demonstrates generalized pronated posture (forward head, protracted scapula, internally rotated shoulders, and flexed trunk and lower extremities)                Edema: mild medial     Gait: short step length, LLE boot     Joint/Motion:     Ankle:  Right:   AROM: WNL 20° Dorsiflexion,  70° Plantarflexion, 30° Inversion, 20° Eversion      Left:   AROM: limited 10° Dorsiflexion,  50° Plantarflexion, 15° Inversion, 10° Eversion        Strength:     Ankle:  Right: Dorsiflexion 4/5, Plantarflexion 4/5, Inversion 4/5, Eversion 4/5  Left: Dorsiflexion 3/5, Plantarflexion 3/5, Inversion 3/5, Eversion 3/5     Palpation: Tender to palpation scar on lateral ankle            Special Tests/Functional Screens:   []? Anterior Drawer []?+ / []? -  [x]? Eversion Stress: [x]?+ / []? -  [x]? Finley Test []?+ / [x]? -             []? Tib-Fib Compression Test []?+ / []? -     [x]? Inversion Stress []?+ / [x]? -     []? Squeeze Test []?+ / []? -   []? Alfa's Sign []?+ / []? -   []?  Other: []?+ / []?       Special test comments: concurrent c pattern of injury  ·     CURRENT STATUS:  · Gait: Pt AMB while displaying no gait deviations  · Strength: LLE ankle MMT dorsiflex, plantarflex, inversion, & eversion all 5/5  · ROM: arom left ankle WFL  · SFMA: Squat Dysfunction No pain  · SFMA: RLS eyes open 10s No Dysfunction No Pain  · Toe Walking No Difficulty. · Heel Walking No Diffiuclty  · RLE SLS: Pt unable to stand RLE SLS for 30s. · Pt AMB treadmill at 2. 5mph No Difficulty & No Pain  · SFMA: RLE SLS eyes closed Dysfunction    OUTCOME MEASURE: LEFS 31.25% Disability    COMMENTS AND RECOMMENDATIONS:   The pt continues to present with instability at LLE, & impaired advanced balance, coordination, & proprioception at RLE. Therefore, I recommend that the pt requires continuation of the skilled services of outpt PT Rehab. Thank you for the opportunity to work with your patient. Fany Ventura, PT [facility]PT [id number]    I CERTIFY THAT THE ABOVE REASSESSMENT AND PLAN OF CARE FOR PHYSICAL THERAPY SERVICES ARE APPROPRIATE AND MEDICALLY NECESSARY.     Duration: From 6/30/2022 thru 8/30/2022    ________________________                _______________  Physician     Date

## 2022-07-01 NOTE — PROGRESS NOTES
[facility] [facility]   Phone: [phone]   Fax: [phone]      Physical Therapy Treatment Note    Date: 2022  Patient Name: Adriano Morillo  : 1990   MRN: [id number]  DOInjury: 2022   Referring Provider: Loyda Deleon MD  [address] [place] [address]     Medical Diagnosis: L ankle pain  Outcome Measure: LEFS 50%     S:  The pt reports that he is scheduled to return to work at [employer] on 2022. O:   Time 4739-6389     Visit 26 Repeat outcome measure at mid point and end. Pain 4/10     ROM impaired      Modalities            Manual            Stretch      Yoga strap TE   With black Xband TE   ` MT         Exercise                                                                                                                                                          Treadmill  10min 2. 5mph  TE   Band step-outs forward, lateral, & backward x30 each R & L Purple band TA   Band stand march  x30 R & L Purple band TA   Band stand leg curl x30 R & L Purple band TA   Steam-boats LLE SLS with RLE SLRs hip flex, ext, & abd x30 each  TA                                                                                                                                                                                                           A:  Tolerated well. The pt reported no pain during Tx session. P: The pt is on hold for any additional PT Rehab until approved by [facility].     Noreen Douglass, PT [facility]PT [id number]    Treatment Charges: Mins Units   Initial Evaluation     Re-Evaluation     Ther Exercise         TE 10 1   Manual Therapy     MT     Ther Activities        TA     Gait Training          GT     Neuro Re-education NR 30 2   Modalities     Non-Billable Service Time     Other     Total Time/Units 40 3

## 2022-07-06 ENCOUNTER — HOSPITAL ENCOUNTER (EMERGENCY)
Age: 32
Discharge: HOME | End: 2022-07-06
Payer: COMMERCIAL

## 2022-07-06 VITALS
SYSTOLIC BLOOD PRESSURE: 138 MMHG | BODY MASS INDEX: 25.73 KG/M2 | DIASTOLIC BLOOD PRESSURE: 81 MMHG | OXYGEN SATURATION: 99 % | WEIGHT: 190 LBS | HEIGHT: 72 IN | HEART RATE: 89 BPM | RESPIRATION RATE: 16 BRPM | TEMPERATURE: 97.8 F

## 2022-07-06 DIAGNOSIS — M54.16 LUMBAR BACK PAIN WITH RADICULOPATHY AFFECTING LEFT LOWER EXTREMITY: Primary | ICD-10-CM

## 2022-07-06 PROCEDURE — 99283 EMERGENCY DEPT VISIT LOW MDM: CPT

## 2022-07-06 RX ORDER — METHYLPREDNISOLONE 4 MG/1
TABLET ORAL
Qty: 21 TABLET | Refills: 0 | Status: SHIPPED | OUTPATIENT
Start: 2022-07-06 | End: 2022-07-12

## 2022-07-06 RX ORDER — CYCLOBENZAPRINE HCL 5 MG
5 TABLET ORAL 3 TIMES DAILY PRN
Qty: 9 TABLET | Refills: 0 | Status: SHIPPED | OUTPATIENT
Start: 2022-07-06 | End: 2022-07-09

## 2022-07-06 ASSESSMENT — PAIN DESCRIPTION - LOCATION: LOCATION: BACK

## 2022-07-06 ASSESSMENT — PAIN DESCRIPTION - DESCRIPTORS: DESCRIPTORS: DISCOMFORT;SORE

## 2022-07-06 ASSESSMENT — PAIN SCALES - GENERAL: PAINLEVEL_OUTOF10: 10

## 2022-07-06 ASSESSMENT — PAIN - FUNCTIONAL ASSESSMENT: PAIN_FUNCTIONAL_ASSESSMENT: 0-10

## 2022-07-06 ASSESSMENT — PAIN DESCRIPTION - ORIENTATION: ORIENTATION: LEFT;LOWER

## 2022-07-06 NOTE — ED PROVIDER NOTES
[facility]  Department of Emergency Medicine   ED  Encounter Note  Admit Date/RoomTime: 2022  4:56 PM  ED Room: [place] [address]    NAME: Patricia Collins  : 1990  MRN: [id number]     Chief Complaint:  Back Pain (left lower back pain. Ongoing for a couple of weeks. Tried to lift something today and had a shooting pain on the left side.)    History of Present Illness       Patricia Collins is a 27 y.o. old male with a prior history of no prior back problems, presents to the emergency department by private vehicle, for non-traumatic acute, aching and sharp left sided lower lumbar spine pain with radiation, to the left lower leg, for 2 week(s) prior to arrival.  Patient reports that approximately 2 weeks prior to arrival he was carrying a heavy object. He states he went to place object down at which point he felt a sharp pulling in his left lower back region. He states that over the next several days it was sore and he was having some shooting pain into his left foot. However, patient states that it was improving overall. He states that today he was at home picking up a 35 pound box during which he experienced a sharp pain over his left lumbar region. He states that this pain is more significant than initial injury and that he has been having difficulty with ambulation and range of motion due to discomfort at this time. Patient reports that he was not evaluated by provider approximately 2 weeks prior when initial injury occurred. He denies any red flag symptoms of bowel or bladder incontinence or saddle anesthesia. Patient denies additional symptoms of additional injury including but not limited to head trauma or loss of consciousness. Patient denies chest pain, shortness of breath, fever, chills, nausea, vomiting, diarrhea. ROS   Pertinent positives and negatives are stated within HPI, all other systems reviewed and are negative.     Past Medical History:  has a past medical history of Ankle fracture, left and COVID. Surgical History:  has a past surgical history that includes Ankle fracture surgery (Left, 1/28/2022). Social History:  reports that he quit smoking about 10 years ago. His smokeless tobacco use includes chew. He reports that he does not drink alcohol and does not use drugs. Family History: family history is not on file. Allergies: Patient has no known allergies. Physical Exam   Oxygen Saturation Interpretation: Normal.        ED Triage Vitals   BP Temp Temp Source Heart Rate Resp SpO2 Height Weight   07/06/22 1654 07/06/22 1621 07/06/22 1621 07/06/22 1620 07/06/22 1654 07/06/22 1620 07/06/22 1654 07/06/22 1654   138/81 97.8 °F (36.6 °C) Temporal 88 16 100 % 6' (1.829 m) 190 lb (86.2 kg)         Constitutional:  Alert, development consistent with age. HEENT:  NC/NT. Airway patent. Neck:  Normal ROM. Supple. Respiratory:  Clear to auscultation and breath sounds equal.  CV:  Regular rate and rhythm, normal heart sounds, without pathological murmurs, ectopy, gallops, or rubs. GI:  Abdomen Soft, nontender, good bowel sounds. No firm or pulsatile mass. Back: lower lumbar spine left sided. Tenderness: Moderate. Swelling: no.              Range of Motion: diminished range with pain. CVA Tenderness: No CVA tenderness. Straight leg raising:  Right positive at 80 degrees. Skin:  no wounds, erythema, or swelling. Distal Function:              Motor deficit: none. Sensory deficit: none. Pulse deficit: none. Calf Tenderness:  No Bilateral.               Edema:  none Both lower extremity(s). Gait:  limp due to affected limb. Integument:  Normal turgor. Warm, dry, without visible rash. Lymphatics: No lymphangitis or adenopathy noted. Neurological:  Oriented. Motor functions intact.     Lab / Imaging Results   (All laboratory and radiology results have been personally reviewed by myself)  Labs:  No results found for this visit on 07/06/22. Imaging: All Radiology results interpreted by Radiologist unless otherwise noted. No orders to display       ED Course / Medical Decision Making   Medications - No data to display         Consult(s):   None    Procedure(s):   none    Medical Decision Making:    Imaging was not obtained based on no suspicion for fracture / bony abnormality as per history/physical findings. Rationale for refraining from x-ray imaging were discussed with the patient as radiation risks outweigh probable findings as he has not had a traumatic injury over the area and his symptoms correlate with musculoskeletal injury. Patient is understandable and agreeable. Patient is appropriate at this time for discharged home with symptomatic care utilizing steroid and muscle relaxer as directed as needed until follow-up with primary care provider for ED after visit. Patient is alert and oriented, no acute distress and afebrile. Patient has no red flag symptoms of bowel or bladder incontinence. Patient is recommended to return back to ER with new or worsening symptoms. Patient states he is both understandable and agreeable to this plan. Plan of Care/Counseling:  GOVIND Pierre reviewed today's visit with the patient in addition to providing specific details for the plan of care and counseling regarding the diagnosis and prognosis. Questions are answered at this time and are agreeable with the plan. Assessment      1. Lumbar back pain with radiculopathy affecting left lower extremity      Plan   Discharged home. Patient condition is good    New Medications     New Prescriptions    CYCLOBENZAPRINE (FLEXERIL) 5 MG TABLET    Take 1 tablet by mouth 3 times daily as needed for Muscle spasms    METHYLPREDNISOLONE (MEDROL, MIO,) 4 MG TABLET    Take by mouth as directed on packaging.      Electronically signed by Marquise Dugan GOVIND Gamble   DD: 7/6/22  **This report was transcribed using voice recognition software. Every effort was made to ensure accuracy; however, inadvertent computerized transcription errors may be present.   END OF ED PROVIDER NOTE     Brandi Whitt  07/06/22 0649

## 2022-08-03 ENCOUNTER — TELEPHONE (OUTPATIENT)
Dept: ORTHOPEDIC SURGERY | Age: 32
End: 2022-08-03

## 2022-08-03 NOTE — TELEPHONE ENCOUNTER
2022 12:17 pm Arabellaadri Garcia from [facility] [place] [employer] [phone] phoned the office / Message left on voice mail: I am calling regarding Ivelisse Duncan, : [birth year]. His date of injury: 2022. He has an appointment w/ the doctor on the  for a full duty release. I received a [employer] dated 2022. It did not outline specific work restrictions. I am requesting clarification of what he can physically do. His employer has some light duty he can do for a couple of weeks so he build up to full duty release, driving, not using his foot while working. Patient was under the impression he would RTW and ramp up to full duty release. He has been released from physical therapy. He is doing exercises at home, just sitting around, he can do that here. Fax updated [employer] w/ What is he physically able to do?     Fax # [phone]  [facility] claim # [id number]    TSB: See PT note dated 2022, see ER note dated 2022  Please advise

## 2022-08-08 NOTE — TELEPHONE ENCOUNTER
8/03/2022 Follow up phone call to [phone] / Message left on [address] voice mail: Informed of [employer]'s response. Also RTW form was faxed on June 28 th. Please check your records.

## 2022-08-18 ENCOUNTER — OFFICE VISIT (OUTPATIENT)
Dept: ORTHOPEDIC SURGERY | Age: 32
End: 2022-08-18
Payer: COMMERCIAL

## 2022-08-18 VITALS — WEIGHT: 200 LBS | HEIGHT: 72 IN | BODY MASS INDEX: 27.09 KG/M2

## 2022-08-18 DIAGNOSIS — S82.842D CLOSED BIMALLEOLAR FRACTURE OF LEFT ANKLE WITH ROUTINE HEALING, SUBSEQUENT ENCOUNTER: Primary | ICD-10-CM

## 2022-08-18 PROCEDURE — 99213 OFFICE O/P EST LOW 20 MIN: CPT | Performed by: ORTHOPAEDIC SURGERY

## 2022-08-18 NOTE — PROGRESS NOTES
follow-up here as needed. Return if symptoms worsen or fail to improve. Current Outpatient Medications   Medication Sig Dispense Refill    ibuprofen (ADVIL;MOTRIN) 200 MG tablet Take 400 mg by mouth every 6 hours as needed for Pain      acetaminophen (TYLENOL) 500 MG tablet Take 500 mg by mouth every 6 hours as needed for Pain       No current facility-administered medications for this visit. Patient Active Problem List   Diagnosis    Abdominal pain, rule out appendicitis     Colitis    Lower abdominal pain    Closed bimalleolar fracture of left ankle       Past Medical History:   Diagnosis Date    Ankle fracture, left     For OR 1/28/22    COVID 12/28/2021       Past Surgical History:   Procedure Laterality Date    ANKLE FRACTURE SURGERY Left 1/28/2022    LEFT ANKLE OPEN REDUCTION INTERNAL FIXATION BIMALLEOLAR ANKLE FRACTURE performed by Mi Morley MD at [facility] OR       No Known Allergies    Social History     Socioeconomic History    Marital status: Single   Tobacco Use    Smoking status: Former    Smokeless tobacco: Current     Types: Chew   Vaping Use    Vaping Use: Never used   Substance and Sexual Activity    Alcohol use: No     Comment: socially    Drug use: No       No family history on file. Review of Systems  As follows except as previously noted in HPI:  Constitutional: Negative for chills, diaphoresis, fatigue, fever and unexpected weight change. Respiratory: Negative for cough, shortness of breath and wheezing. Cardiovascular: Negative for chest pain and palpitations. Neurological: Negative for dizziness, syncope, cephalgia. GI / : negative  Musculoskeletal: see HPI       Objective:   Physical Exam   Constitutional: Oriented to person, place, and time. and appears well-developed and well-nourished. :   Head: Normocephalic and atraumatic. Eyes: EOM are normal.   Neck: Neck supple. Cardiovascular: Normal rate and regular rhythm.     Pulmonary/Chest: Effort normal. No stridor. No respiratory distress, no wheezes. Abdominal:  No abnormal distension. Neurological: Alert and oriented to person, place, and time. Skin: Skin is warm and dry. Psychiatric: Normal mood and affect.  Behavior is normal. Thought content normal.    8/18/2022  9:46 AM

## 2022-09-21 ENCOUNTER — HOSPITAL ENCOUNTER (EMERGENCY)
Age: 32
Discharge: HOME | End: 2022-09-21
Attending: EMERGENCY MEDICINE
Payer: COMMERCIAL

## 2022-09-21 VITALS
OXYGEN SATURATION: 98 % | HEART RATE: 70 BPM | SYSTOLIC BLOOD PRESSURE: 112 MMHG | WEIGHT: 190 LBS | HEIGHT: 72 IN | BODY MASS INDEX: 25.73 KG/M2 | DIASTOLIC BLOOD PRESSURE: 62 MMHG | RESPIRATION RATE: 16 BRPM | TEMPERATURE: 98 F

## 2022-09-21 DIAGNOSIS — T40.411A ACCIDENTAL FENTANYL OVERDOSE, INITIAL ENCOUNTER (HCC): Primary | ICD-10-CM

## 2022-09-21 PROCEDURE — 90714 TD VACC NO PRESV 7 YRS+ IM: CPT | Performed by: STUDENT IN AN ORGANIZED HEALTH CARE EDUCATION/TRAINING PROGRAM

## 2022-09-21 PROCEDURE — 90471 IMMUNIZATION ADMIN: CPT | Performed by: STUDENT IN AN ORGANIZED HEALTH CARE EDUCATION/TRAINING PROGRAM

## 2022-09-21 PROCEDURE — 99284 EMERGENCY DEPT VISIT MOD MDM: CPT

## 2022-09-21 PROCEDURE — 6360000002 HC RX W HCPCS: Performed by: STUDENT IN AN ORGANIZED HEALTH CARE EDUCATION/TRAINING PROGRAM

## 2022-09-21 RX ORDER — TETANUS AND DIPHTHERIA TOXOIDS ADSORBED 2; 2 [LF]/.5ML; [LF]/.5ML
0.5 INJECTION INTRAMUSCULAR ONCE
Status: COMPLETED | OUTPATIENT
Start: 2022-09-21 | End: 2022-09-21

## 2022-09-21 RX ORDER — NALOXONE HYDROCHLORIDE 4 MG/.1ML
1 SPRAY NASAL PRN
Status: DISCONTINUED | OUTPATIENT
Start: 2022-09-21 | End: 2022-09-21 | Stop reason: HOSPADM

## 2022-09-21 RX ADMIN — NALOXONE HYDROCHLORIDE 1 SPRAY: 4 SPRAY NASAL at 15:10

## 2022-09-21 RX ADMIN — TETANUS AND DIPHTHERIA TOXOIDS ADSORBED 0.5 ML: 2; 2 INJECTION INTRAMUSCULAR at 14:10

## 2022-09-21 ASSESSMENT — PAIN - FUNCTIONAL ASSESSMENT: PAIN_FUNCTIONAL_ASSESSMENT: NONE - DENIES PAIN

## 2022-09-21 NOTE — Clinical Note
Amanda Gordon was seen and treated in our emergency department on 9/21/2022. He may return to work on 09/23/2022. If you have any questions or concerns, please don't hesitate to call.       Elzbieta Vasquez MD

## 2022-09-21 NOTE — ED NOTES
NALOXONE:  Patient Assessment and Dispensing Record   Date:  9/21/2022  Patient Name: Pastor Clements  Patient Address: [place] [id number]         Patient Selection: Check that the following conditions are met  [x]  The patient is an individual who is experiencing or at risk of experiencing an       opioid-related overdose. [x]  The individual receiving the information and medication is        oriented to person, place, and time and able to understand and learn the        essential components of overdose response and naloxone administration. Indication for dispensing naloxone  [x]  Previous opioid intoxication or overdose. [x]  History of nonmedical opioid use.   []  Initiation or cessation of methadone or buprenorphine for opioid use disorder        treatment. []  Higher-dose (>50 mg morphine equivalent/day) opioid prescription. []  Receiving any opioid prescription plus (select below):  [] Rotated from one opioid to another because of possible incomplete cross-tolerance. [] Smoking, COPD, emphysema, asthma, sleep apnea, respiratory infection or other respiratory illness. [] Renal dysfunction, hepatic disease, cardiac illness or HIV/AIDS. [] Known or suspected concurrent alcohol use. [] Concurrent benzodiazepine or other sedative prescription. [] Concurrent antidepressant prescription. [] Patients who may have difficulty accessing emergency medical services (distance, remoteness). [] Voluntary request from a family member, friend,  or other person in a position to assist an individual who is at risk of experiencing an opioid-related overdose. I (the undersigned) attest that:  [x]  I am authorized per hospital protocol to dispense naloxone to this patient. [x]  The individual has been screened for contraindications/precautions and        counseled appropriately. [x]  I have counseled the patient using the protocol approved informational pamphlet.    [x]  I have updated the discharge record to reflect this dispensing of naloxone. Signature:   Duane Paci, [facility]  9/21/2022, 3:00 PM

## 2022-09-21 NOTE — ED PROVIDER NOTES
[facility]  Department of Emergency Medicine  Admit Date/RoomTime: 9/21/2022  1:34 PM  ED Room: [facility]/                9/21/22  1:38 PM EDT    History of Present Illness:   Mackenzie Whitlock is a 27 y.o. male presenting to the ED for accidental heroin overdose, beginning prior to arrival.  The complaint has been constant, severe in severity, and worsened by nothing. Patient reports accidental overdose on heroin. he was only trying to get high. he reports that he was only using the drug recreationally and was not trying to harm self. Completely denies any suicidal ideation. The patient did receive narcan prior to arrival.         Review of Systems:   A complete review of systems was performed and pertinent positives and negatives are stated within HPI, all other systems reviewed and are negative.          --------------------------------------------- PAST HISTORY ---------------------------------------------  Past Medical History:  has a past medical history of Ankle fracture, left and COVID. Past Surgical History:  has a past surgical history that includes Ankle fracture surgery (Left, 1/28/2022). Social History:  reports that he has quit smoking. His smokeless tobacco use includes chew. He reports that he does not drink alcohol and does not use drugs. Family History: family history is not on file. The patients home medications have been reviewed. Allergies: Patient has no known allergies. -------------------------------------------------- RESULTS -------------------------------------------------  All laboratory and radiology results have been personally reviewed by myself   LABS:  No results found for this visit on 09/21/22. RADIOLOGY:  Interpreted by Radiologist.  No orders to display       ------------------------- NURSING NOTES AND VITALS REVIEWED ---------------------------   The nursing notes within the ED encounter and vital signs as below have been reviewed.    BP 115/63   Pulse 78   Temp 98.1 °F (36.7 °C) (Oral)   Resp 16   Ht 6' (1.829 m)   Wt 190 lb (86.2 kg)   SpO2 98%   BMI 25.77 kg/m²   Oxygen Saturation Interpretation: Normal      ---------------------------------------------------PHYSICAL EXAM--------------------------------------    Constitutional/General: Alert and oriented x3, well appearing, non toxic in NAD  Head: Normocephalic and atraumatic. Has a cut on his left ear. Eyes: PERRL, EOMI, conjunctiva normal, sclera non icteric  Mouth: Oropharynx clear, handling secretions, no trismus, no asymmetry of the posterior oropharynx or uvular edema  Neck: Supple, full ROM, non tender to palpation in the midline, no stridor, no crepitus, no meningeal signs  Respiratory: Lungs clear to auscultation bilaterally, no wheezes, rales, or rhonchi. Not in respiratory distress  Cardiovascular:  Regular rate. Regular rhythm. No murmurs, gallops, or rubs. 2+ distal pulses  Chest: No chest wall tenderness  GI:  Abdomen Soft, Non tender, Non distended. +BS. No organomegaly, no palpable masses,  No rebound, guarding, or rigidity. Musculoskeletal: Moves all extremities x 4. Warm and well perfused, no clubbing, cyanosis, or edema. Capillary refill <3 seconds  Integument: skin warm and dry. No rashes. Neurologic: GCS 15, no focal deficits, symmetric strength 5/5 in the upper and lower extremities bilaterally  Psychiatric: Normal Affect      ------------------------------ ED COURSE/MEDICAL DECISION MAKING----------------------  Medications   diptheria-tetanus toxoids (DECAVAC) 2-2 LF/0.5ML injection 0.5 mL (0.5 mLs IntraMUSCular Given 22 1410)         Medical Decision Makin-year-old male no apparent past medical history history of opiate abuse in the past, per him is been clean for years presents for an accidental drug overdose. States that he does not been any SI or HI. He was using the drug recreationally. Vitals within normal limits.   On physical exam patient is no acute distress, speaking full sentences alert and orient x3. He has a cut on his left ear he sustained from getting a haircut today. States that he snorted fentanyl today. States that he passed out but did not hit his head. His higuera called EMS. He was given Narcan by EMS and was awake and alert. Labs and imaging not required. Patient informed of the results evaluation. He was monitored for 1 hour in the department with no relapse of symptoms. The laceration of his left ear does not require suturing. He was given tetanus in the department. Patient discharged with Narcan. Informed of all the results of evaluation and is agreeable plan. Counseling: The emergency provider has spoken with the patient and discussed todays results, in addition to providing specific details for the plan of care and counseling regarding the diagnosis and prognosis. Questions are answered at this time and they are agreeable with the plan.      --------------------------------- IMPRESSION AND DISPOSITION ---------------------------------    IMPRESSION  1.  Accidental fentanyl overdose, initial encounter [facility])        DISPOSITION  Disposition: Discharge to home  Patient condition is good                Jatin Goff MD  Resident  09/21/22 [id number]

## 2022-09-21 NOTE — CARE COORDINATION
Peer Recovery Support Note    Name: Mary Massing  Date: 9/21/2022    Chief Complaint   Patient presents with    Drug Overdose     Accidental overdose on Fentanyl. Given 2 rounds IN Narcan by EMS PTA. Arrived to ED alert and oriented. Peer Support met with patient. [x] Support and education provided  [] Resources provided   [] Treatment referral:   [] Other:   [] Patient declined peer recovery services     Referred By:     Notes: Patient relapsed after 4 years.  study was notified and support was given.      Signed: Ginny Everett, 9/21/2022

## 2023-01-01 ENCOUNTER — APPOINTMENT (OUTPATIENT)
Dept: GENERAL RADIOLOGY | Age: 33
End: 2023-01-01
Payer: COMMERCIAL

## 2023-01-01 ENCOUNTER — HOSPITAL ENCOUNTER (EMERGENCY)
Age: 33
Discharge: HOME | End: 2023-01-01
Attending: EMERGENCY MEDICINE
Payer: COMMERCIAL

## 2023-01-01 VITALS
WEIGHT: 183 LBS | SYSTOLIC BLOOD PRESSURE: 120 MMHG | OXYGEN SATURATION: 99 % | DIASTOLIC BLOOD PRESSURE: 70 MMHG | HEART RATE: 98 BPM | RESPIRATION RATE: 14 BRPM | BODY MASS INDEX: 24.82 KG/M2 | TEMPERATURE: 98 F

## 2023-01-01 DIAGNOSIS — U07.1 COVID: Primary | ICD-10-CM

## 2023-01-01 LAB
ALBUMIN SERPL-MCNC: 4.2 G/DL (ref 3.5–5.2)
ALP BLD-CCNC: 73 U/L (ref 40–129)
ALT SERPL-CCNC: 94 U/L (ref 0–40)
ANION GAP SERPL CALCULATED.3IONS-SCNC: 12 MMOL/L (ref 7–16)
AST SERPL-CCNC: 62 U/L (ref 0–39)
BASOPHILS ABSOLUTE: 0.02 E9/L (ref 0–0.2)
BASOPHILS RELATIVE PERCENT: 0.5 % (ref 0–2)
BILIRUB SERPL-MCNC: 0.4 MG/DL (ref 0–1.2)
BUN BLDV-MCNC: 9 MG/DL (ref 6–20)
CALCIUM SERPL-MCNC: 8.9 MG/DL (ref 8.6–10.2)
CHLORIDE BLD-SCNC: 103 MMOL/L (ref 98–107)
CO2: 21 MMOL/L (ref 22–29)
CREAT SERPL-MCNC: 0.9 MG/DL (ref 0.7–1.2)
EOSINOPHILS ABSOLUTE: 0.01 E9/L (ref 0.05–0.5)
EOSINOPHILS RELATIVE PERCENT: 0.2 % (ref 0–6)
GFR SERPL CREATININE-BSD FRML MDRD: >60 ML/MIN/1.73
GLUCOSE BLD-MCNC: 113 MG/DL (ref 74–99)
HCT VFR BLD CALC: 43.9 % (ref 37–54)
HEMOGLOBIN: 15.4 G/DL (ref 12.5–16.5)
IMMATURE GRANULOCYTES #: 0.02 E9/L
IMMATURE GRANULOCYTES %: 0.5 % (ref 0–5)
INFLUENZA A: NOT DETECTED
INFLUENZA B: NOT DETECTED
LACTIC ACID: 0.7 MMOL/L (ref 0.5–2.2)
LYMPHOCYTES ABSOLUTE: 0.21 E9/L (ref 1.5–4)
LYMPHOCYTES RELATIVE PERCENT: 4.8 % (ref 20–42)
MCH RBC QN AUTO: 31.4 PG (ref 26–35)
MCHC RBC AUTO-ENTMCNC: 35.1 % (ref 32–34.5)
MCV RBC AUTO: 89.4 FL (ref 80–99.9)
MONOCYTES ABSOLUTE: 0.64 E9/L (ref 0.1–0.95)
MONOCYTES RELATIVE PERCENT: 14.7 % (ref 2–12)
NEUTROPHILS ABSOLUTE: 3.44 E9/L (ref 1.8–7.3)
NEUTROPHILS RELATIVE PERCENT: 79.3 % (ref 43–80)
PDW BLD-RTO: 12.5 FL (ref 11.5–15)
PLATELET # BLD: 170 E9/L (ref 130–450)
PMV BLD AUTO: 10.5 FL (ref 7–12)
POTASSIUM SERPL-SCNC: 3.9 MMOL/L (ref 3.5–5)
RBC # BLD: 4.91 E12/L (ref 3.8–5.8)
SARS-COV-2 RNA, RT PCR: DETECTED
SODIUM BLD-SCNC: 136 MMOL/L (ref 132–146)
TOTAL PROTEIN: 7 G/DL (ref 6.4–8.3)
TROPONIN, HIGH SENSITIVITY: <6 NG/L (ref 0–11)
WBC # BLD: 4.3 E9/L (ref 4.5–11.5)

## 2023-01-01 PROCEDURE — 93005 ELECTROCARDIOGRAM TRACING: CPT | Performed by: EMERGENCY MEDICINE

## 2023-01-01 PROCEDURE — 85025 COMPLETE CBC W/AUTO DIFF WBC: CPT

## 2023-01-01 PROCEDURE — 71045 X-RAY EXAM CHEST 1 VIEW: CPT

## 2023-01-01 PROCEDURE — 83605 ASSAY OF LACTIC ACID: CPT

## 2023-01-01 PROCEDURE — 99285 EMERGENCY DEPT VISIT HI MDM: CPT

## 2023-01-01 PROCEDURE — 2580000003 HC RX 258: Performed by: EMERGENCY MEDICINE

## 2023-01-01 PROCEDURE — 87636 SARSCOV2 & INF A&B AMP PRB: CPT

## 2023-01-01 PROCEDURE — 84484 ASSAY OF TROPONIN QUANT: CPT

## 2023-01-01 PROCEDURE — 36415 COLL VENOUS BLD VENIPUNCTURE: CPT

## 2023-01-01 PROCEDURE — 80053 COMPREHEN METABOLIC PANEL: CPT

## 2023-01-01 RX ORDER — 0.9 % SODIUM CHLORIDE 0.9 %
1000 INTRAVENOUS SOLUTION INTRAVENOUS ONCE
Status: COMPLETED | OUTPATIENT
Start: 2023-01-01 | End: 2023-01-01

## 2023-01-01 RX ADMIN — SODIUM CHLORIDE 1000 ML: 9 INJECTION, SOLUTION INTRAVENOUS at 09:30

## 2023-01-01 ASSESSMENT — LIFESTYLE VARIABLES: HOW OFTEN DO YOU HAVE A DRINK CONTAINING ALCOHOL: NEVER

## 2023-01-01 NOTE — ED PROVIDER NOTES
HPI:  1/1/23, Time: 9:24 AM CARMELLA Kohler is a 34 y.o. male presenting to the ED for fever 104 this morning and body aches, fatigue, congestion, cough, diarrhea, anorexia and headache beginning yesterday. He was exposed to Influenza A. The complaint has been persistent, moderate in severity, and worsened by nothing. He did take Tylenol this morning for his fever. He also reports some \"chest constriction\" with coughing. He is a nonsmoker. He has no significant past medical history. Patient denies sore throat, shortness of breath, edema, visual disturbances, focal paresthesias, focal weakness, abdominal pain, nausea, vomiting, constipation, dysuria, hematuria, trauma, neck or back pain, rash or other complaints. ROS:   A complete review of systems was performed and all pertinent positives and negatives are stated within HPI, all other systems reviewed and are negative.      --------------------------------------------- PAST HISTORY ---------------------------------------------  Past Medical History:  has a past medical history of Ankle fracture, left and COVID. Past Surgical History:  has a past surgical history that includes Ankle fracture surgery (Left, 1/28/2022). Social History:  reports that he has quit smoking. His smokeless tobacco use includes chew. He reports that he does not drink alcohol and does not use drugs. Family History: family history is not on file. The patients home medications have been reviewed. Allergies: Patient has no known allergies. ----------------------------------------PHYSICAL EXAM--------------------------------------  Constitutional:  Well developed, well nourished, no acute distress, non-toxic appearance   Eyes:  PERRL, conjunctiva normal, EOMI  HENT:  Atraumatic, external ears normal, nose normal, oropharynx moist, no pharyngeal exudates, redness or swelling. TMs clear and intact bilaterally. No trismus. Airway patent. . Neck- normal range of motion, no nuchal rigidity   Respiratory:  No respiratory distress, normal breath sounds, no rales, no wheezing   Cardiovascular:  Normal rate, normal rhythm, no murmurs, no gallops, no rubs. Radial and DP pulses 2+ bilaterally. Compartments are soft. He is warm and well perfused. Cap refill less than 3 seconds. GI:  Soft, nondistended, normal bowel sounds, nontender, no organomegaly, no mass, no rebound, no guarding   :  No costovertebral angle tenderness   Musculoskeletal:  No edema, no tenderness, no deformities. Back- no tenderness  Integument:  Well hydrated, no rash. Adequate perfusion. Lymphatic:  No cervical lymphadenopathy noted   Neurologic:  Alert & oriented x 3, CN 2-12 normal, no focal deficits noted. Normal gait. Psychiatric:  Speech and behavior appropriate       -------------------------------------------------- RESULTS -------------------------------------------------  I have personally reviewed all laboratory and imaging results for this patient. Results are listed below.      LABS:  Results for orders placed or performed during the hospital encounter of 01/01/23   COVID-19 & Influenza Combo    Specimen: Nasopharyngeal Swab   Result Value Ref Range    SARS-CoV-2 RNA, RT PCR DETECTED (A) NOT DETECTED    INFLUENZA A NOT DETECTED NOT DETECTED    INFLUENZA B NOT DETECTED NOT DETECTED   CBC with Auto Differential   Result Value Ref Range    WBC 4.3 (L) 4.5 - 11.5 E9/L    RBC 4.91 3.80 - 5.80 E12/L    Hemoglobin 15.4 12.5 - 16.5 g/dL    Hematocrit 43.9 37.0 - 54.0 %    MCV 89.4 80.0 - 99.9 fL    MCH 31.4 26.0 - 35.0 pg    MCHC 35.1 (H) 32.0 - 34.5 %    RDW 12.5 11.5 - 15.0 fL    Platelets 365 724 - 600 E9/L    MPV 10.5 7.0 - 12.0 fL    Neutrophils % 79.3 43.0 - 80.0 %    Immature Granulocytes % 0.5 0.0 - 5.0 %    Lymphocytes % 4.8 (L) 20.0 - 42.0 %    Monocytes % 14.7 (H) 2.0 - 12.0 %    Eosinophils % 0.2 0.0 - 6.0 %    Basophils % 0.5 0.0 - 2.0 %    Neutrophils Absolute 3.44 1.80 - 7.30 E9/L    Immature Granulocytes # 0.02 E9/L    Lymphocytes Absolute 0.21 (L) 1.50 - 4.00 E9/L    Monocytes Absolute 0.64 0.10 - 0.95 E9/L    Eosinophils Absolute 0.01 (L) 0.05 - 0.50 E9/L    Basophils Absolute 0.02 0.00 - 0.20 E9/L   Comprehensive Metabolic Panel   Result Value Ref Range    Sodium 136 132 - 146 mmol/L    Potassium 3.9 3.5 - 5.0 mmol/L    Chloride 103 98 - 107 mmol/L    CO2 21 (L) 22 - 29 mmol/L    Anion Gap 12 7 - 16 mmol/L    Glucose 113 (H) 74 - 99 mg/dL    BUN 9 6 - 20 mg/dL    Creatinine 0.9 0.7 - 1.2 mg/dL    Est, Glom Filt Rate >60 >=60 mL/min/1.73    Calcium 8.9 8.6 - 10.2 mg/dL    Total Protein 7.0 6.4 - 8.3 g/dL    Albumin 4.2 3.5 - 5.2 g/dL    Total Bilirubin 0.4 0.0 - 1.2 mg/dL    Alkaline Phosphatase 73 40 - 129 U/L    ALT 94 (H) 0 - 40 U/L    AST 62 (H) 0 - 39 U/L   Lactic Acid   Result Value Ref Range    Lactic Acid 0.7 0.5 - 2.2 mmol/L   Troponin   Result Value Ref Range    Troponin, High Sensitivity <6 0 - 11 ng/L   EKG 12 Lead   Result Value Ref Range    Ventricular Rate 98 BPM    Atrial Rate 98 BPM    P-R Interval 128 ms    QRS Duration 82 ms    Q-T Interval 326 ms    QTc Calculation (Bazett) 416 ms    P Axis 66 degrees    R Axis 54 degrees    T Axis 18 degrees       RADIOLOGY:  Interpreted by Radiologist.  XR CHEST PORTABLE   Final Result   No pneumonia or pleural effusion. EKG Interpretation  Time: 09:39 AM EDT  Rhythm: normal sinus   Rate: 98  Axis: normal  Conduction: normal  ST Segments: no acute change  T Waves: no acute change  Clinical Impression: no acute changes  Comparison to prior EKG: None      ------------------------- NURSING NOTES AND VITALS REVIEWED ---------------------------  The nursing notes within the ED encounter and vital signs as below have been reviewed by myself.     /70   Pulse 98   Temp 98 °F (36.7 °C) (Oral)   Resp 14   Wt 183 lb (83 kg)   SpO2 99%   BMI 24.82 kg/m²   Oxygen Saturation Interpretation: Normal      The patients available past medical records and past encounters were reviewed. ------------------------------ ED COURSE/MEDICAL DECISION MAKING----------------------  Medications   0.9 % sodium chloride bolus (0 mLs IntraVENous Stopped 1/1/23 1110)           Procedures:   none      Medical Decision Making:    Flu negative, chest x-ray clear, labs are reassuring. He does have a mild low white blood cell count which is consistent with a viral infection. His COVID test here is positive. Troponin flat, EKG unremarkable, unlikely cardiac. Low risk for PE. He appears well, nontoxic, neurovascularly intact and ambulatory upon discharge. His symptoms have improved with some IV fluids. Infection control measures and COVID precautions reviewed. Work note provided. Patient was explicitly instructed on specific signs and symptoms on which to return to the emergency room for. Patient was instructed to return to the ER for any new or worsening symptoms. Additional discharge instructions were given verbally. All questions were answered. Patient is comfortable and agreeable with discharge plan. Patient in no acute distress and non-toxic in appearance. This patient's ED course included: re-evaluation prior to disposition, IV medications, and a personal history and physicial eaxmination    This patient has remained hemodynamically stable and improved during their ED course. Re-Evaluations:  Time: 11:00 AM EST   Re-evaluation. Patients symptoms are improving  Repeat physical examination is not changed      Consultations:   none    Critical Care: none    Víctor PORTILLO, DO, am the Primary Provider of Record    Counseling: The emergency provider has spoken with the patient and discussed todays results, in addition to providing specific details for the plan of care and counseling regarding the diagnosis and prognosis.   Questions are answered at this time and they are agreeable with the plan.    --------------------------- IMPRESSION AND DISPOSITION ---------------------------------    IMPRESSION  1. COVID        DISPOSITION  Disposition: Discharge to home  Patient condition is stable              Radha Rodriguez DO  01/01/23 1123

## 2023-01-01 NOTE — Clinical Note
Marina Martinez was seen and treated in our emergency department on 1/1/2023. COVID19 virus is suspected. Per the CDC guidelines we recommend home isolation until the following conditions are all met:    1. At least five days have passed since symptoms first appeared and/or had a close exposure,   2. After home isolation for five days, wearing a mask around others for the next five days,  3. At least 24 have passed since last fever without the use of fever-reducing medications and  4. Symptoms (eg cough, shortness of breath) have improved    If you have any questions or concerns, please don't hesitate to call.     He may return to work/school on 01/06/2023        Gary Maya DO

## 2023-01-02 LAB
EKG ATRIAL RATE: 98 BPM
EKG P AXIS: 66 DEGREES
EKG P-R INTERVAL: 128 MS
EKG Q-T INTERVAL: 326 MS
EKG QRS DURATION: 82 MS
EKG QTC CALCULATION (BAZETT): 416 MS
EKG R AXIS: 54 DEGREES
EKG T AXIS: 18 DEGREES
EKG VENTRICULAR RATE: 98 BPM

## 2023-01-02 PROCEDURE — 93010 ELECTROCARDIOGRAM REPORT: CPT | Performed by: INTERNAL MEDICINE

## 2023-12-15 ENCOUNTER — APPOINTMENT (OUTPATIENT)
Dept: GENERAL RADIOLOGY | Age: 33
End: 2023-12-15
Payer: COMMERCIAL

## 2023-12-15 ENCOUNTER — HOSPITAL ENCOUNTER (EMERGENCY)
Age: 33
Discharge: HOME | End: 2023-12-15
Attending: STUDENT IN AN ORGANIZED HEALTH CARE EDUCATION/TRAINING PROGRAM
Payer: COMMERCIAL

## 2023-12-15 VITALS
SYSTOLIC BLOOD PRESSURE: 117 MMHG | TEMPERATURE: 97.6 F | HEART RATE: 71 BPM | DIASTOLIC BLOOD PRESSURE: 66 MMHG | BODY MASS INDEX: 24.11 KG/M2 | RESPIRATION RATE: 16 BRPM | OXYGEN SATURATION: 98 % | HEIGHT: 72 IN | WEIGHT: 178 LBS

## 2023-12-15 DIAGNOSIS — R07.89 ATYPICAL CHEST PAIN: Primary | ICD-10-CM

## 2023-12-15 DIAGNOSIS — R00.2 PALPITATIONS: ICD-10-CM

## 2023-12-15 LAB
ALBUMIN SERPL-MCNC: 4.5 G/DL (ref 3.5–5.2)
ALP SERPL-CCNC: 47 U/L (ref 40–129)
ALT SERPL-CCNC: 33 U/L (ref 0–40)
ANION GAP SERPL CALCULATED.3IONS-SCNC: 12 MMOL/L (ref 7–16)
AST SERPL-CCNC: 25 U/L (ref 0–39)
BASOPHILS # BLD: 0.02 K/UL (ref 0–0.2)
BASOPHILS NFR BLD: 0 % (ref 0–2)
BILIRUB SERPL-MCNC: 0.6 MG/DL (ref 0–1.2)
BUN SERPL-MCNC: 12 MG/DL (ref 6–20)
CALCIUM SERPL-MCNC: 8.9 MG/DL (ref 8.6–10.2)
CHLORIDE SERPL-SCNC: 105 MMOL/L (ref 98–107)
CO2 SERPL-SCNC: 23 MMOL/L (ref 22–29)
CREAT SERPL-MCNC: 0.9 MG/DL (ref 0.7–1.2)
EOSINOPHIL # BLD: 0.08 K/UL (ref 0.05–0.5)
EOSINOPHILS RELATIVE PERCENT: 1 % (ref 0–6)
ERYTHROCYTE [DISTWIDTH] IN BLOOD BY AUTOMATED COUNT: 11.5 % (ref 11.5–15)
GFR SERPL CREATININE-BSD FRML MDRD: >60 ML/MIN/1.73M2
GLUCOSE SERPL-MCNC: 95 MG/DL (ref 74–99)
HCT VFR BLD AUTO: 41.8 % (ref 37–54)
HGB BLD-MCNC: 14.9 G/DL (ref 12.5–16.5)
IMM GRANULOCYTES # BLD AUTO: <0.03 K/UL (ref 0–0.58)
IMM GRANULOCYTES NFR BLD: 0 % (ref 0–5)
LYMPHOCYTES NFR BLD: 2.1 K/UL (ref 1.5–4)
LYMPHOCYTES RELATIVE PERCENT: 33 % (ref 20–42)
MAGNESIUM SERPL-MCNC: 1.8 MG/DL (ref 1.6–2.6)
MCH RBC QN AUTO: 31.7 PG (ref 26–35)
MCHC RBC AUTO-ENTMCNC: 35.6 G/DL (ref 32–34.5)
MCV RBC AUTO: 88.9 FL (ref 80–99.9)
MONOCYTES NFR BLD: 0.67 K/UL (ref 0.1–0.95)
MONOCYTES NFR BLD: 10 % (ref 2–12)
NEUTROPHILS NFR BLD: 55 % (ref 43–80)
NEUTS SEG NFR BLD: 3.54 K/UL (ref 1.8–7.3)
PLATELET # BLD AUTO: 196 K/UL (ref 130–450)
PMV BLD AUTO: 10.1 FL (ref 7–12)
POTASSIUM SERPL-SCNC: 4.1 MMOL/L (ref 3.5–5)
PROT SERPL-MCNC: 7 G/DL (ref 6.4–8.3)
RBC # BLD AUTO: 4.7 M/UL (ref 3.8–5.8)
SODIUM SERPL-SCNC: 140 MMOL/L (ref 132–146)
TROPONIN I SERPL HS-MCNC: <6 NG/L (ref 0–11)
TSH SERPL DL<=0.05 MIU/L-ACNC: 1.35 UIU/ML (ref 0.27–4.2)
WBC OTHER # BLD: 6.4 K/UL (ref 4.5–11.5)

## 2023-12-15 PROCEDURE — 84443 ASSAY THYROID STIM HORMONE: CPT

## 2023-12-15 PROCEDURE — 85025 COMPLETE CBC W/AUTO DIFF WBC: CPT

## 2023-12-15 PROCEDURE — 71045 X-RAY EXAM CHEST 1 VIEW: CPT

## 2023-12-15 PROCEDURE — 80053 COMPREHEN METABOLIC PANEL: CPT

## 2023-12-15 PROCEDURE — 83735 ASSAY OF MAGNESIUM: CPT

## 2023-12-15 PROCEDURE — 99285 EMERGENCY DEPT VISIT HI MDM: CPT

## 2023-12-15 PROCEDURE — 84484 ASSAY OF TROPONIN QUANT: CPT

## 2023-12-15 RX ORDER — HYDROXYZINE HYDROCHLORIDE 25 MG/1
25 TABLET, FILM COATED ORAL EVERY 8 HOURS PRN
Qty: 10 TABLET | Refills: 0 | Status: SHIPPED | OUTPATIENT
Start: 2023-12-15 | End: 2023-12-18

## 2023-12-15 ASSESSMENT — PAIN DESCRIPTION - ORIENTATION: ORIENTATION: MID;LEFT

## 2023-12-15 ASSESSMENT — LIFESTYLE VARIABLES: HOW OFTEN DO YOU HAVE A DRINK CONTAINING ALCOHOL: NEVER

## 2023-12-15 ASSESSMENT — PAIN DESCRIPTION - DESCRIPTORS: DESCRIPTORS: DISCOMFORT;TIGHTNESS

## 2023-12-15 ASSESSMENT — PAIN - FUNCTIONAL ASSESSMENT: PAIN_FUNCTIONAL_ASSESSMENT: 0-10

## 2023-12-15 ASSESSMENT — PAIN DESCRIPTION - LOCATION: LOCATION: CHEST

## 2023-12-16 NOTE — ED PROVIDER NOTES
[facility]      Pt Name: Nikki Collazo  MRN: [id number]  [address] [facility] [place] 1990  Date of evaluation: 12/15/2023  Provider: Viki Bolanos DO  PCP: Jermaine Muniz DO  Note Started: 8:31 PM EST 12/15/23    CHIEF COMPLAINT       Chief Complaint   Patient presents with    Chest Pain     Chest pain mid left radiates to mid abd. Dizzy, lightheaded, sob, X1 month but worse the past two days. HISTORY OF PRESENT ILLNESS: 1 or more Elements   History From: Patient  Limitations to history : None    Nikki Collazo is a 28 y.o. male who presents to the ED due to chest pain. Patient states that he has had some mild midsternal chest tightness that he describes as a painful sensation that radiates into his left side of his chest.  Denies any radiation of the pain into the neck, jaw, shoulders or back. Patient states that over the past several months he has had intermittent episodes of tachycardia with associated lightheadedness and mild shortness of breath. He denies any associated syncopal episodes with the symptoms. Patient states that over the past few days he has had increased frequency of these episodes at home. Says that his heart rate has ranged from the 40s up into the 90s. Denies being seen for this in the past and has not been evaluated cardiology to this point. Denies any recent cough, fever or chills. Nursing Notes were all reviewed and agreed with or any disagreements were addressed in the HPI. REVIEW OF SYSTEMS :    Positives and Pertinent negatives as per HPI. PAST MEDICAL HISTORY/Chronic Conditions Affecting Care    has a past medical history of Ankle fracture, left and COVID (12/28/2021).      SURGICAL HISTORY     Past Surgical History:   Procedure Laterality Date    ANKLE FRACTURE SURGERY Left 1/28/2022    LEFT ANKLE OPEN REDUCTION INTERNAL FIXATION BIMALLEOLAR ANKLE FRACTURE performed by

## 2023-12-17 LAB
EKG ATRIAL RATE: 76 BPM
EKG P AXIS: 77 DEGREES
EKG P-R INTERVAL: 132 MS
EKG Q-T INTERVAL: 356 MS
EKG QRS DURATION: 100 MS
EKG QTC CALCULATION (BAZETT): 400 MS
EKG R AXIS: 82 DEGREES
EKG T AXIS: 63 DEGREES
EKG VENTRICULAR RATE: 76 BPM

## 2024-02-19 ENCOUNTER — HOSPITAL ENCOUNTER (OUTPATIENT)
Dept: MRI IMAGING | Age: 34
Discharge: HOME | End: 2024-02-21
Payer: COMMERCIAL

## 2024-02-19 DIAGNOSIS — G44.001 INTRACTABLE CLUSTER HEADACHE SYNDROME, UNSPECIFIED CHRONICITY PATTERN: ICD-10-CM

## 2024-02-19 DIAGNOSIS — R20.2 PARESTHESIA: ICD-10-CM

## 2024-02-19 PROCEDURE — 70551 MRI BRAIN STEM W/O DYE: CPT

## (undated) DEVICE — BNDG,ELSTC,MATRIX,STRL,6"X5YD,LF,HOOK&LP: Brand: MEDLINE

## (undated) DEVICE — TUBING SUCT 12FR MAL ALUM SHFT FN CAP VENT UNIV CONN W/ OBT

## (undated) DEVICE — PADDING,UNDERCAST,COTTON, 4"X4YD STERILE: Brand: MEDLINE

## (undated) DEVICE — INSTRUMENT SYSTEM 4 BATTERY REUSABLE

## (undated) DEVICE — GLOVE ORANGE PI 7   MSG9070

## (undated) DEVICE — DRAPE,EXTREMITY,89X128,STERILE: Brand: MEDLINE

## (undated) DEVICE — BIT DRL L110MM DIA2.5MM ST G QUIK CPL NONRADIOPAQUE W/O STP

## (undated) DEVICE — GOWN,SIRUS,POLYRNF,BRTHSLV,XL,30/CS: Brand: MEDLINE

## (undated) DEVICE — GOWN,SIRUS,POLYRNF,BRTHSLV,LG,30/CS: Brand: MEDLINE

## (undated) DEVICE — SOLUTION IV IRRIG WATER 1000ML POUR BRL 2F7114

## (undated) DEVICE — CLOTH SURG PREP PREOPERATIVE CHLORHEXIDINE GLUC 2% READYPREP

## (undated) DEVICE — BRUNNS CURRETTES

## (undated) DEVICE — PADDING CAST W6INXL4YD COT LO LINTING WYTEX

## (undated) DEVICE — INTENDED FOR TISSUE SEPARATION, AND OTHER PROCEDURES THAT REQUIRE A SHARP SURGICAL BLADE TO PUNCTURE OR CUT.: Brand: BARD-PARKER ® STAINLESS STEEL BLADES

## (undated) DEVICE — DRAPE C ARM W41XL74IN UNIV MOB W RUBBERBAND CLP

## (undated) DEVICE — INTENDED FOR TISSUE SEPARATION, AND OTHER PROCEDURES THAT REQUIRE A SHARP SURGICAL BLADE TO PUNCTURE OR CUT.: Brand: BARD-PARKER ® CARBON RIB-BACK BLADES

## (undated) DEVICE — Device

## (undated) DEVICE — 3M™ COBAN™ NL STERILE NON-LATEX SELF-ADHERENT WRAP, 2084S, 4 IN X 5 YD (10 CM X 4,5 M), 18 ROLLS/CASE: Brand: 3M™ COBAN™

## (undated) DEVICE — TRAY DRILL SYSTEM 4 REUSABLE

## (undated) DEVICE — GLOVE ORANGE PI 7 1/2   MSG9075

## (undated) DEVICE — 3M™ STERI-DRAPE™ U-DRAPE 1015: Brand: STERI-DRAPE™

## (undated) DEVICE — SPONGE LAP W18XL18IN WHT COT 4 PLY FLD STRUNG RADPQ DISP ST

## (undated) DEVICE — TOWEL,OR,DSP,ST,BLUE,STD,6/PK,12PK/CS: Brand: MEDLINE

## (undated) DEVICE — MARKER,SKIN,WI/RULER AND LABELS: Brand: MEDLINE

## (undated) DEVICE — BNDG,ELSTC,MATRIX,STRL,4"X5YD,LF,HOOK&LP: Brand: MEDLINE

## (undated) DEVICE — SPLINT ORTH W5XL30IN PLSTR OF PARIS FAST IMMOB OF FRAC HI

## (undated) DEVICE — DRESSING PETRO W3XL8IN OIL EMUL N ADH GZ KNIT IMPREG CELOS

## (undated) DEVICE — 4-PORT MANIFOLD: Brand: NEPTUNE 2

## (undated) DEVICE — GOWN,SIRUS,NONRNF,SETINSLV,XL,20/CS: Brand: MEDLINE

## (undated) DEVICE — ELECTRODE PT RET AD L9FT HI MOIST COND ADH HYDRGEL CORDED

## (undated) DEVICE — SOLUTION IV IRRIG POUR BRL 0.9% SODIUM CHL 2F7124

## (undated) DEVICE — RETRACTOR SENN

## (undated) DEVICE — GAUZE,SPONGE,4"X4",8PLY,STRL,LF,10/TRAY: Brand: MEDLINE

## (undated) DEVICE — PACK PROCEDURE SURG GEN CUST

## (undated) DEVICE — MATERIAL CAST W5XL45IN BLU LBL SPLNT PLSTR OF PARIS

## (undated) DEVICE — ZIMMER® STERILE DISPOSABLE TOURNIQUET CUFF WITH PLC, DUAL PORT, SINGLE BLADDER, 34 IN. (86 CM)

## (undated) DEVICE — NEEDLE HYPO 25GA L1.5IN BLU POLYPR HUB S STL REG BVL STR

## (undated) DEVICE — COVER HNDL LT DISP

## (undated) DEVICE — APPLICATOR PREP 26ML 0.7% IOD POVACRYLEX 74% ISO ALC ST

## (undated) DEVICE — BIT DRL 3 FLUT 2.7X125 MM QC SS STRL LCP

## (undated) DEVICE — STOCKINETTE,DOUBLE PLY,6X48,STERILE: Brand: MEDLINE

## (undated) DEVICE — GLOVE ORANGE PI 8   MSG9080

## (undated) DEVICE — DOUBLE BASIN SET: Brand: MEDLINE INDUSTRIES, INC.

## (undated) DEVICE — BIT DRL L125MM DIA2MM S STL QUIK CPL FOR LOK COMPR PLT

## (undated) DEVICE — SET ORTHO STD STORTSTD1